# Patient Record
Sex: MALE | Race: WHITE | NOT HISPANIC OR LATINO | Employment: OTHER | URBAN - METROPOLITAN AREA
[De-identification: names, ages, dates, MRNs, and addresses within clinical notes are randomized per-mention and may not be internally consistent; named-entity substitution may affect disease eponyms.]

---

## 2019-05-22 ENCOUNTER — APPOINTMENT (OUTPATIENT)
Dept: WOUND CARE | Facility: HOSPITAL | Age: 59
End: 2019-05-22
Payer: MEDICARE

## 2019-05-22 PROCEDURE — 11045 DBRDMT SUBQ TISS EACH ADDL: CPT | Performed by: FAMILY MEDICINE

## 2019-05-22 PROCEDURE — 99205 OFFICE O/P NEW HI 60 MIN: CPT | Performed by: FAMILY MEDICINE

## 2019-05-22 PROCEDURE — 11042 DBRDMT SUBQ TIS 1ST 20SQCM/<: CPT | Performed by: FAMILY MEDICINE

## 2019-06-20 ENCOUNTER — APPOINTMENT (OUTPATIENT)
Dept: WOUND CARE | Facility: HOSPITAL | Age: 59
End: 2019-06-20
Payer: MEDICARE

## 2019-06-20 PROCEDURE — 11042 DBRDMT SUBQ TIS 1ST 20SQCM/<: CPT | Performed by: FAMILY MEDICINE

## 2019-06-20 PROCEDURE — 97597 DBRDMT OPN WND 1ST 20 CM/<: CPT | Performed by: FAMILY MEDICINE

## 2021-03-30 DIAGNOSIS — Z23 ENCOUNTER FOR IMMUNIZATION: ICD-10-CM

## 2023-08-25 ENCOUNTER — TELEPHONE (OUTPATIENT)
Dept: PALLIATIVE MEDICINE | Facility: CLINIC | Age: 63
End: 2023-08-25

## 2023-09-06 ENCOUNTER — OFFICE VISIT (OUTPATIENT)
Dept: PALLIATIVE MEDICINE | Facility: CLINIC | Age: 63
End: 2023-09-06
Payer: MEDICARE

## 2023-09-06 ENCOUNTER — CLINICAL SUPPORT (OUTPATIENT)
Dept: PALLIATIVE MEDICINE | Facility: CLINIC | Age: 63
End: 2023-09-06

## 2023-09-06 VITALS — HEART RATE: 67 BPM | TEMPERATURE: 97.7 F | RESPIRATION RATE: 16 BRPM | OXYGEN SATURATION: 98 %

## 2023-09-06 DIAGNOSIS — Z71.89 GOALS OF CARE, COUNSELING/DISCUSSION: ICD-10-CM

## 2023-09-06 DIAGNOSIS — Z51.5 PALLIATIVE CARE BY SPECIALIST: ICD-10-CM

## 2023-09-06 DIAGNOSIS — G82.50 QUADRIPLEGIA (HCC): ICD-10-CM

## 2023-09-06 DIAGNOSIS — I51.9 HEART DISEASE: Primary | ICD-10-CM

## 2023-09-06 DIAGNOSIS — Z71.89 COUNSELING AND COORDINATION OF CARE: Primary | ICD-10-CM

## 2023-09-06 DIAGNOSIS — G47.09 OTHER INSOMNIA: ICD-10-CM

## 2023-09-06 DIAGNOSIS — I35.0 SEVERE AORTIC STENOSIS: ICD-10-CM

## 2023-09-06 DIAGNOSIS — R06.00 DYSPNEA, UNSPECIFIED TYPE: ICD-10-CM

## 2023-09-06 DIAGNOSIS — R06.02 SHORTNESS OF BREATH: ICD-10-CM

## 2023-09-06 PROCEDURE — 99205 OFFICE O/P NEW HI 60 MIN: CPT | Performed by: INTERNAL MEDICINE

## 2023-09-06 PROCEDURE — 99024 POSTOP FOLLOW-UP VISIT: CPT

## 2023-09-06 RX ORDER — DIPHENOXYLATE HYDROCHLORIDE AND ATROPINE SULFATE 2.5; .025 MG/1; MG/1
1 TABLET ORAL DAILY
COMMUNITY

## 2023-09-06 RX ORDER — LOSARTAN POTASSIUM 25 MG/1
25 TABLET ORAL DAILY PRN
COMMUNITY

## 2023-09-06 RX ORDER — ROSUVASTATIN CALCIUM 20 MG/1
20 TABLET, COATED ORAL DAILY
COMMUNITY
Start: 2023-08-02 | End: 2024-08-01

## 2023-09-06 RX ORDER — NALOXONE HYDROCHLORIDE 4 MG/.1ML
SPRAY NASAL
Qty: 1 EACH | Refills: 1 | Status: SHIPPED | OUTPATIENT
Start: 2023-09-06 | End: 2024-09-05

## 2023-09-06 RX ORDER — CLOTRIMAZOLE 10 MG/1
10 LOZENGE ORAL; TOPICAL
COMMUNITY

## 2023-09-06 RX ORDER — VENLAFAXINE 100 MG/1
100 TABLET ORAL 3 TIMES DAILY
COMMUNITY

## 2023-09-06 RX ORDER — MORPHINE SULFATE 15 MG/1
15 TABLET ORAL EVERY 4 HOURS PRN
COMMUNITY
End: 2023-09-06

## 2023-09-06 RX ORDER — MULTIVITAMIN WITH IRON
1 TABLET ORAL EVERY EVENING
COMMUNITY

## 2023-09-06 RX ORDER — HYDROXYZINE HYDROCHLORIDE 25 MG/1
25 TABLET, FILM COATED ORAL DAILY PRN
COMMUNITY

## 2023-09-06 RX ORDER — METFORMIN HYDROCHLORIDE 500 MG/5ML
500 SOLUTION ORAL DAILY
COMMUNITY
Start: 2023-08-01

## 2023-09-06 RX ORDER — GABAPENTIN 400 MG/1
1200 CAPSULE ORAL 3 TIMES DAILY
COMMUNITY
Start: 2023-08-01

## 2023-09-06 RX ORDER — MORPHINE SULFATE 100 MG/5ML
5 SOLUTION, CONCENTRATE ORAL EVERY 2 HOUR PRN
Qty: 30 ML | Refills: 0 | Status: SHIPPED | OUTPATIENT
Start: 2023-09-06

## 2023-09-06 RX ORDER — MIDODRINE HYDROCHLORIDE 5 MG/1
2.5 TABLET ORAL
COMMUNITY

## 2023-09-06 NOTE — PROGRESS NOTES
Palliative Supportive Care  met with patient/family to continue to provide emotional support and guidance. Updated biopsychosocial information relevant to support:  LCSW had the pleasure of meeting patient and wife for their first visit with Palliative Medicine - daughter-in-law attended via phone. Dr. Indira Dickinson was able to discuss patient's medical questions/concerns. LCSW provided an overview of SW support within Palliative. Wife is appropriately tearful regarding the potential of surgical intervention not being offered or it being very risky. LCSW was able to provide a supportive environment to start the conversation. Provided the explanation that these are difficult conversations but that being open and honest with each other they can begin to process and address the inevitable that everyone's time is limited. Also, stating that discussions do not change the medical situation but provides a supportive experience. Wife does report that recently patient has been growning in his sleep. This may be related to his recent medical changes and the tough decisions that may come soon. Patient feels comfortable with his  and may want to start these conversations with him. Patient and wife are offered counseling support by LCSW if/when they feel it useful. Identified areas of need include: continuity and support  Resources provided: office contacts  Areas that need future follow-up include:  Emotional support and guidance as requested.       I have spent 60 minutes with Patient  today in which greater than 50% of this time was spent in counseling/coordination of care     Palliative  will follow-up as requested by patient, family, and primary team.  Please contact with any specific requests

## 2023-09-06 NOTE — PROGRESS NOTES
Palliative and Supportive Care   Uriah Rodriguez 66 y.o. male [id number]    Assessment/Plan:  1. Heart disease    2. Dyspnea, unspecified type    3. Shortness of breath    4. Severe aortic stenosis    5. Quadriplegia ([address])    6. Palliative care by specialist    7. Goals of care, counseling/discussion    8. Other insomnia      Will trial roxanol 5mg PRN for dyspnea with close monitoring for side effects or hemodynamic instability (has tolerated MSIR 15mg in past but still will be cautious). For insomnia, discussed journaling and counseling. [facility] SW provided resources and guidance. Discussed goals of care, patient has POA/ACP docs. Encouraged to update ACP docs and provided with advanced directive forms. Current care plan is to pursue workup for possible TAVR and then reassess depending on options. Formal follow up in 4-6 weeks, but instructed to call my office with any feedback on morphine therapy or should clinical condition change. Requested Prescriptions     Signed Prescriptions Disp Refills   • Morphine Sulfate, Concentrate, 20 mg/mL concentrated solution 30 mL 0     Sig: Take 0.25 mL (5 mg total) by mouth every 2 (two) hours as needed (breathlessness) Max Daily Amount: 60 mg   • naloxone (NARCAN) 4 mg/0.1 mL nasal spray 1 each 1     Sig: Administer 1 spray into a nostril. If no response after 2-3 minutes, give another dose in the other nostril using a new spray. Medications Discontinued During This Encounter   Medication Reason   • morphine (MSIR) 15 mg tablet        Representatives have queried the patient's controlled substance dispensing history in the Prescription Drug Monitoring Program in compliance with regulations before I have prescribed any controlled substances. The prescription history is consistent with prescribed therapy and our practice policies.       75+ minutes (10:15-11:30) were spent face to face with Uriah Rodriguez, his spouse and his daughter in law SUNDSVALL with greater than 50% of the time spent in counseling or coordination of care including discussions of treatment instructions . All of the patient's questions were answered during this discussion. No follow-ups on file. Subjective:   Chief Complaint  New consultation for:  symptom management, goal of care assessment and decisional support, disease process education and discussion of prognosis, advance care planning, emotional support in the setting of serious illness  HPI     Karlie Lafleur is a 66 y.o. male with 20+ year hx of quadraplegia as well as previous hx of stroke/seizure, severe AS, and recent nephrostomy tube placement for nephrolithiasis for which he was recently hospitalized. Since that hospitalization he has had significant dyspnea when not lying flat that has markedly worsened his QoL. Instead of being able to sit and eat with family, or transport short distances, he now is mostly bed-bound with limited time in his chair. Discussed at great length how this has affected his quality of life with patient, his spouse, and his daughter-in-law. Denies new weakness, postural changes, loss of muscle tone, edema. No changes in appetite. Very occasional nausea without vomiting. Is struggling with insomnia and feels his mind is racing. No bowel changes. Does endorse pain in his shoulder which is chronic for years. Takes one MSIR approx. 3 times per year but mostly relies on tylenol. The following portions of the medical history were reviewed: past medical history, problem list, medication list, and social history.     Current Outpatient Medications:   •  clotrimazole (MYCELEX) 10 mg justin, Take 10 mg by mouth, Disp: , Rfl:   •  gabapentin (NEURONTIN) 400 mg capsule, Take 1,200 mg by mouth 3 (three) times a day, Disp: , Rfl:   •  hydrOXYzine HCL (ATARAX) 25 mg tablet, Take 25 mg by mouth daily as needed, Disp: , Rfl:   •  losartan (COZAAR) 25 mg tablet, Take 25 mg by mouth daily as needed, Disp: , Rfl:   •  Magnesium 250 MG TABS, Take 1 tablet by mouth every evening, Disp: , Rfl:   •  Metformin HCl (RIOMET) 500 MG/5ML oral solution, Take 500 mg by mouth in the morning, Disp: , Rfl:   •  midodrine (PROAMATINE) 5 mg tablet, Take 2.5 mg by mouth, Disp: , Rfl:   •  Morphine Sulfate, Concentrate, 20 mg/mL concentrated solution, Take 0.25 mL (5 mg total) by mouth every 2 (two) hours as needed (breathlessness) Max Daily Amount: 60 mg, Disp: 30 mL, Rfl: 0  •  multivitamin (THERAGRAN) TABS, Take 1 tablet by mouth daily, Disp: , Rfl:   •  naloxone (NARCAN) 4 mg/0.1 mL nasal spray, Administer 1 spray into a nostril. If no response after 2-3 minutes, give another dose in the other nostril using a new spray., Disp: 1 each, Rfl: 1  •  rosuvastatin (CRESTOR) 20 MG tablet, Take 20 mg by mouth daily, Disp: , Rfl:   •  venlafaxine (EFFEXOR) 100 MG tablet, Take 100 mg by mouth Three times a day, Disp: , Rfl:   Review of Systems   Constitutional: Positive for activity change. Negative for unexpected weight change. HENT: Negative for congestion. Respiratory: Positive for shortness of breath. Negative for chest tightness. Cardiovascular: Negative for chest pain and leg swelling. Gastrointestinal: Positive for nausea. Negative for abdominal pain, constipation, diarrhea and vomiting. Musculoskeletal: Positive for arthralgias. Neurological: Negative for syncope and light-headedness. Psychiatric/Behavioral: Positive for sleep disturbance. The patient is nervous/anxious. All other systems reviewed and are negative. All other systems negative    Objective:  Vital Signs  Pulse 67   Temp 97.7 °F (36.5 °C) (Temporal)   Resp 16   SpO2 98%    Physical Exam    Constitutional: Appears well-developed and well-nourished. In no acute distress. Head: Normocephalic and atraumatic. Eyes: EOM are normal. Right eye exhibits no discharge. Left eye exhibits no discharge. No scleral icterus. Pulmonary/Chest: Effort normal. No stridor. No respiratory distress. Abdominal: No distension. Musculoskeletal: No edema. B/l UE/LE contractures. In a power wheelchair. Neurological: Alert, oriented and appropriately conversant. Skin: Skin is dry, not diaphoretic. Psychiatric: Displays a normal mood and affect. Behavior, judgement and thought content appear normal.   Vitals reviewed.

## 2023-09-07 ENCOUNTER — TELEPHONE (OUTPATIENT)
Dept: PALLIATIVE MEDICINE | Facility: CLINIC | Age: 63
End: 2023-09-07

## 2023-09-07 NOTE — TELEPHONE ENCOUNTER
Spouse called in stated the morphine worked wanted to know if he can increase it because it is not lasting long enough. Only lasting about a hour, takes shortness breath away.

## 2023-09-26 ENCOUNTER — TELEPHONE (OUTPATIENT)
Dept: PALLIATIVE MEDICINE | Facility: CLINIC | Age: 63
End: 2023-09-26

## 2023-09-26 NOTE — TELEPHONE ENCOUNTER
Jose Reddy is calling wanted you to be aware that they were told Tl jackson have his kidney surgery and if you want to give her a call she can give you more details.

## 2023-10-25 ENCOUNTER — OFFICE VISIT (OUTPATIENT)
Dept: PALLIATIVE MEDICINE | Facility: CLINIC | Age: 63
End: 2023-10-25

## 2023-10-25 ENCOUNTER — TELEPHONE (OUTPATIENT)
Age: 63
End: 2023-10-25

## 2023-10-25 VITALS
HEART RATE: 79 BPM | SYSTOLIC BLOOD PRESSURE: 112 MMHG | DIASTOLIC BLOOD PRESSURE: 68 MMHG | TEMPERATURE: 98.2 F | RESPIRATION RATE: 18 BRPM | OXYGEN SATURATION: 97 %

## 2023-10-25 DIAGNOSIS — Z71.89 GOALS OF CARE, COUNSELING/DISCUSSION: Primary | ICD-10-CM

## 2023-10-25 DIAGNOSIS — R06.02 SHORTNESS OF BREATH: ICD-10-CM

## 2023-10-25 DIAGNOSIS — I35.0 SEVERE AORTIC STENOSIS: ICD-10-CM

## 2023-10-25 DIAGNOSIS — G82.50 QUADRIPLEGIA (HCC): ICD-10-CM

## 2023-10-25 DIAGNOSIS — Z51.5 PALLIATIVE CARE BY SPECIALIST: ICD-10-CM

## 2023-10-25 DIAGNOSIS — G47.09 OTHER INSOMNIA: ICD-10-CM

## 2023-10-25 DIAGNOSIS — N13.2 HYDRONEPHROSIS WITH OBSTRUCTING CALCULUS: ICD-10-CM

## 2023-10-25 NOTE — TELEPHONE ENCOUNTER
Patient's Palliative Care provider Dr. Kamala Byrd MD office called today wanting to have the patient scheduled ASAP. Caller was transferred to triage.

## 2023-10-25 NOTE — PROGRESS NOTES
Palliative and Supportive Care   Jono Ravi 66 y.o. male [id number]    Assessment/Plan:  1. Goals of care, counseling/discussion    2. Palliative care by specialist    3. Severe aortic stenosis    4. Shortness of breath    5. Other insomnia    6. Quadriplegia ([address])    7. Hydronephrosis with obstructing calculus      Continue with current roxanol dosing. No med changes today. Patient interested in receiving 2nd opinion in [facility]. Will make appropriate referrals. Follow up in 2 months. Requested Prescriptions      No prescriptions requested or ordered in this encounter     There are no discontinued medications. Representatives have queried the patient's controlled substance dispensing history in the Prescription Drug Monitoring Program in compliance with regulations before I have prescribed any controlled substances. The prescription history is consistent with prescribed therapy and our practice policies. 20 minutes were spent face to face with Jono Ravi and his spouse with greater than 50% of the time spent in counseling or coordination of care including discussions of risks and benefits of treatment, treatment instructions, and patient and family counseling/involvement in care . All of the patient's questions were answered during this discussion. No follow-ups on file. Subjective:   Chief Complaint  Follow up visit for:  symptom management, assessment of goals of care  HPI     Jono Ravi is a 66 y.o. male with severe AS as well as hydronephrosis s/p nephrostomy tube insertion who presents for follow up. Symptomatically he is stable to improved. Endorses improved exertional tolerance from using his ISB. Morphine continues to be effective, giving him approx 1 hour of symptom improvement. Otherwise he remains comfortable when he is recumbent. Chronic pain is present, but managed. Denies constipation.     Overall he and his spouse are frustrated with his lack of a definitive care plan. They feel that if they could get answers about what interventions are being offered (or not) they would be able to plan accordingly. Nevertheless, they focus on taking things one day at a time. The following portions of the medical history were reviewed: past medical history, problem list, medication list, and social history. Current Outpatient Medications:     clotrimazole (MYCELEX) 10 mg justin, Take 10 mg by mouth, Disp: , Rfl:     gabapentin (NEURONTIN) 400 mg capsule, Take 1,200 mg by mouth 3 (three) times a day, Disp: , Rfl:     hydrOXYzine HCL (ATARAX) 25 mg tablet, Take 25 mg by mouth daily as needed, Disp: , Rfl:     losartan (COZAAR) 25 mg tablet, Take 25 mg by mouth daily as needed, Disp: , Rfl:     Magnesium 250 MG TABS, Take 1 tablet by mouth every evening, Disp: , Rfl:     Metformin HCl (RIOMET) 500 MG/5ML oral solution, Take 500 mg by mouth in the morning, Disp: , Rfl:     midodrine (PROAMATINE) 5 mg tablet, Take 2.5 mg by mouth, Disp: , Rfl:     Morphine Sulfate, Concentrate, 20 mg/mL concentrated solution, Take 0.25 mL (5 mg total) by mouth every 2 (two) hours as needed (breathlessness) Max Daily Amount: 60 mg, Disp: 30 mL, Rfl: 0    multivitamin (THERAGRAN) TABS, Take 1 tablet by mouth daily, Disp: , Rfl:     naloxone (NARCAN) 4 mg/0.1 mL nasal spray, Administer 1 spray into a nostril.  If no response after 2-3 minutes, give another dose in the other nostril using a new spray., Disp: 1 each, Rfl: 1    rosuvastatin (CRESTOR) 20 MG tablet, Take 20 mg by mouth daily, Disp: , Rfl:     venlafaxine (EFFEXOR) 100 MG tablet, Take 100 mg by mouth Three times a day, Disp: , Rfl:   Review of Systems    All other systems negative    Objective:  Vital Signs  /68 (BP Location: Right arm, Patient Position: Sitting, Cuff Size: Standard)   Pulse 79   Temp 98.2 °F (36.8 °C) (Temporal)   Resp 18   SpO2 97%    Physical Exam    Constitutional: Appears well-developed and well-nourished. In no acute distress. Head: Normocephalic and atraumatic. Eyes: EOM are normal. Right eye exhibits no discharge. Left eye exhibits no discharge. No scleral icterus. Pulmonary/Chest: Effort normal. No stridor. No respiratory distress. Abdominal: No distension. Musculoskeletal: No edema. Stable contractures. Neurological: Alert, oriented and appropriately conversant. Skin: Skin is dry, not diaphoretic. Psychiatric: Displays a normal mood and affect. Behavior, judgement and thought content appear normal.   Vitals reviewed.

## 2023-10-25 NOTE — TELEPHONE ENCOUNTER
Spoke to [place] (wife) and we scheduled him for the [place] location for 11/1/2023. Made two attempts to speak to Abhishek Pelayo, called [phone]. Told her to call back and ask for [facility].

## 2023-10-26 NOTE — TELEPHONE ENCOUNTER
Per nurse, appointment time changed slightly.   Message left for patient that he should arrive 11/1/23 at 9:15 for a 9:30 appointment with Dr. Donnie Dykes at [facility].

## 2023-10-30 ENCOUNTER — TELEPHONE (OUTPATIENT)
Age: 63
End: 2023-10-30

## 2023-10-30 NOTE — TELEPHONE ENCOUNTER
Patient's wife Karthik Liang called regarding patient struggling with blood sugar- was unsure if he was low or high. Did not test.  In between switching from [facility] to [facility], looking for guidance.

## 2023-10-30 NOTE — PROGRESS NOTES
10/30/2023 12:18 PM -      Call placed to patient's spouse, Rose Ortiz, to follow up on reports of a challenging weekend. Unable to contact, and did not leave VM. Will remain available to provide guidance to family. Appreciate pt has urology and cardiac surgery appts in the next 2 weeks. Mulu Szymanski MD  Palliative and Supportive Care  Clinic/Answering Service: [phone]  You can find me on [employer]!

## 2023-11-01 ENCOUNTER — OFFICE VISIT (OUTPATIENT)
Dept: UROLOGY | Facility: CLINIC | Age: 63
End: 2023-11-01
Payer: COMMERCIAL

## 2023-11-01 VITALS — DIASTOLIC BLOOD PRESSURE: 54 MMHG | HEART RATE: 54 BPM | SYSTOLIC BLOOD PRESSURE: 82 MMHG

## 2023-11-01 DIAGNOSIS — N20.0 CALCULUS OF KIDNEY: ICD-10-CM

## 2023-11-01 DIAGNOSIS — R06.02 SHORTNESS OF BREATH: ICD-10-CM

## 2023-11-01 DIAGNOSIS — Z51.5 PALLIATIVE CARE BY SPECIALIST: Primary | ICD-10-CM

## 2023-11-01 DIAGNOSIS — G82.50 QUADRIPLEGIA (HCC): ICD-10-CM

## 2023-11-01 DIAGNOSIS — I35.0 SEVERE AORTIC STENOSIS: ICD-10-CM

## 2023-11-01 PROCEDURE — 99204 OFFICE O/P NEW MOD 45 MIN: CPT | Performed by: UROLOGY

## 2023-11-01 RX ORDER — FAMOTIDINE 40 MG/1
TABLET, FILM COATED ORAL
COMMUNITY
Start: 2023-08-28

## 2023-11-01 NOTE — LETTER
November 1, 2023     Dilcia Lorenzo DO  [address] [place] [address]    Patient: Hermilo Fuentes   YOB: 1960   Date of Visit: 11/1/2023       Dear Dr. Blanco Bean: Thank you for referring Hermilo Fuentes to me for evaluation. Below are my notes for this consultation. If you have questions, please do not hesitate to call me. I look forward to following your patient along with you. Sincerely,        Karoline Sanchez MD        CC: Farrell Sacks, MD Clora Picking, MD Rosary Jenkins, MD  11/1/2023 10:10 AM  Sign when Signing Visit  UROLOGY SECOND OPINION NOTE     CHIEF COMPLAINT   Hermilo Fuentes is a 66 y.o. male with a complaint of   Chief Complaint   Patient presents with   • New Patient Visit       History of Present Illness:   Hermilo Fuentes is a 66 y.o. male here for second opinion. Patient has been treated by [facility] urology. 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 Patient has had long-term management of his bladder with suprapubic tube. Patient has significant stone burden in the right kidney with large calyceal stones and a large obstructing proximal ureteral stone. Nephrostomy tube was placed for decompression in July. Patient has significant medical comorbidities from a cardiac and pulmonary standpoint. He has been followed by CT surgery and pulmonology at [facility]. Patient potentially needs valve surgery but there is concerns about ongoing pulmonary issues. The urology team there has had challenges obtaining medical clearances to be able to offer the patient stone surgery given the comorbidities. Patient has seen palliative care at our institution who requested a second opinion with urology. Additionally, second opinion CT surgery appointment upcoming in November. Patient was seen by the [facility] urology group on October 30 and offered PCNL.     Urinary Score(s)     AUA SYMPTOM SCORE Flowsheet Row Most Recent Value   AUA SYMPTOM SCORE    How often have you had a sensation of not emptying your bladder completely after you finished urinating? 0 (P)     How often have you had to urinate again less than two hours after you finished urinating? 0 (P)     How often have you found you stopped and started again several times when you urinate? 0 (P)     How often have you found it difficult to postpone urination? 0 (P)     How often have you had a weak urinary stream? 0 (P)     How often have you had to push or strain to begin urination? 0 (P)     How many times did you most typically get up to urinate from the time you went to bed at night until the time you got up in the morning? 0 (P)     Quality of Life: If you were to spend the rest of your life with your urinary condition just the way it is now, how would you feel about that? 5 (P)     AUA SYMPTOM SCORE 0 (P)                Past Medical History:     Past Medical History:   Diagnosis Date   • Anemia    • CHF (congestive heart failure) ([address])    • Diabetes mellitus ([address])    • Hypertension    • Seizures ([address])    • Stroke ([address])        PAST SURGICAL HISTORY:     Past Surgical History:   Procedure Laterality Date   • SPINE SURGERY         CURRENT MEDICATIONS:     Current Outpatient Medications   Medication Sig Dispense Refill   • clotrimazole (MYCELEX) 10 mg justin Take 10 mg by mouth     • famotidine (PEPCID) 40 MG tablet      • gabapentin (NEURONTIN) 400 mg capsule Take 1,200 mg by mouth 3 (three) times a day     • hydrOXYzine HCL (ATARAX) 25 mg tablet Take 25 mg by mouth daily as needed     • losartan (COZAAR) 25 mg tablet Take 25 mg by mouth daily as needed     • Magnesium 250 MG TABS Take 1 tablet by mouth every evening     • Metformin HCl (RIOMET) 500 MG/5ML oral solution Take 500 mg by mouth in the morning     • midodrine (PROAMATINE) 5 mg tablet Take 2.5 mg by mouth     • Morphine Sulfate, Concentrate, 20 mg/mL concentrated solution Take 0.25 mL (5 mg total) by mouth every 2 (two) hours as needed (breathlessness) Max Daily Amount: 60 mg 30 mL 0   • multivitamin (THERAGRAN) TABS Take 1 tablet by mouth daily     • naloxone (NARCAN) 4 mg/0.1 mL nasal spray Administer 1 spray into a nostril. If no response after 2-3 minutes, give another dose in the other nostril using a new spray. 1 each 1   • rosuvastatin (CRESTOR) 20 MG tablet Take 20 mg by mouth daily     • venlafaxine (EFFEXOR) 100 MG tablet Take 100 mg by mouth Three times a day       No current facility-administered medications for this visit. ALLERGIES:     Allergies   Allergen Reactions   • Dopamine Shortness Of Breath   • Cefepime Hives     clotilde. piperacillin   • Ciprofloxacin GI Intolerance   • Daptomycin Other (See Comments)     elevated cpk,clotilde vanco       SOCIAL HISTORY:     Social History     Socioeconomic History   • Marital status: /Civil Union     Spouse name: None   • Number of children: None   • Years of education: None   • Highest education level: None   Occupational History   • None   Tobacco Use   • Smoking status: Never   • Smokeless tobacco: Never   Vaping Use   • Vaping Use: Never used   Substance and Sexual Activity   • Alcohol use: Not Currently   • Drug use: Never   • Sexual activity: Not Currently     Partners: Female     Birth control/protection: Abstinence   Other Topics Concern   • None   Social History Narrative   • None     Social Determinants of Health     Financial Resource Strain: Not on file   Food Insecurity: Not on file   Transportation Needs: Not on file   Physical Activity: Not on file   Stress: Not on file   Social Connections: Not on file   Intimate Partner Violence: Not on file   Housing Stability: Not on file       SOCIAL HISTORY:     Family History   Problem Relation Age of Onset   • COPD Mother    • Heart disease Mother        REVIEW OF SYSTEMS:     Review of Systems   Constitutional:  Positive for activity change. Negative for chills and fever. HENT:  Negative for ear pain and sore throat. Eyes:  Negative for pain and visual disturbance. Respiratory:  Positive for shortness of breath. Negative for cough. Cardiovascular:  Negative for chest pain and palpitations. Gastrointestinal:  Negative for abdominal pain and vomiting. Genitourinary:  Negative for dysuria, flank pain and hematuria. Musculoskeletal:  Positive for gait problem. Negative for arthralgias and back pain. Skin:  Negative for color change and rash. Neurological:  Negative for seizures, syncope and speech difficulty. All other systems reviewed and are negative. PHYSICAL EXAM:     BP (!) 82/54 (BP Location: Right arm, Patient Position: Sitting, Cuff Size: Adult)   Pulse (!) 54     Physical Exam  Vitals reviewed. Constitutional:       General: He is not in acute distress. Appearance: He is well-developed. Comments: Quadriplegic male, wheelchair-bound   HENT:      Head: Normocephalic and atraumatic. Eyes:      Pupils: Pupils are equal, round, and reactive to light. Cardiovascular:      Rate and Rhythm: Normal rate. Pulmonary:      Effort: Pulmonary effort is normal. No respiratory distress. Breath sounds: Normal breath sounds. Abdominal:      General: There is no distension. Palpations: Abdomen is soft. Tenderness: There is no abdominal tenderness. Comments: Right-sided nephrostomy tube   Genitourinary:     Comments: Suprapubic tube  Musculoskeletal:         General: Normal range of motion. Cervical back: Normal range of motion and neck supple. Skin:     General: Skin is warm and dry. Neurological:      Mental Status: He is alert and oriented to person, place, and time.       Comments: Quadriplegia   Psychiatric:         Behavior: Behavior normal.         LABS:     CBC: No results found for: "WBC", "HGB", "HCT", "MCV", "PLT"    BMP: No results found for: "GLUCOSE", "CALCIUM", "NA", "K", "CO2", "CL", "BUN", "CREATININE"      IMAGING:     OUTSIDE CT 7/2023    ASSESSMENT:     66 y.o. male  with significant right-sided partial staghorn stone burden, neurogenic bladder managed with suprapubic tube    PLAN:     I had a very nice conversation with the patient and his wife. We discussed management of the patient's stone. We discussed percutaneous nephrolithotomy surgery as the gold standard for large stone burden. This allows the patient to have the least amount of surgical intervention with the highest rate of stone free clearance. This is certainly the surgery that would be preferred given the existing nephrostomy tube and the degree of stone seen on CT. However, this surgery is complicated as it is more aggressive with a higher risk profile. This higher risk is compounded by the patient's quadriplegia, history of autonomic dysreflexia, contractures and positioning, current valve and pulmonary disorders. It appears that the patient has been offered PCNL surgery which is being scheduled at [facility] at the current time. If the team is able to overcome cardiac and pulmonary clearances, I recommended that he proceed with the surgery as offered. He understands that at our institution PCNL surgery would be offered under a general anesthetic. However, I think given the degree of complexity here, I would recommend against PCNL surgery here at [facility]. If the [facility] team is not comfortable where the scheduling cannot proceed, I have discussed the offer of a referral to a fellowship trained Endo urology stone expert in [place]. Patient does have significant valve disease and at current the [facility] cardiothoracic team is deferring TAVR given the infection risk from the suprapubic, nephrostomy tube and stone burden. Certainly superinfection of the valve implant would be disastrous.   However, even if PCNL surgery is successful and the nephrostomy and stones are removed, the patient will still have infection risk from the long-term indwelling suprapubic tube. Patient has an upcoming second opinion with [facility] cardiothoracic team.  Certainly their opinion will be valuable as there is increased cardiovascular risk for a PCNL surgery given the ongoing valve concerns. While I remain available to assist the patient with a third opinion referral, the patient and his wife are currently comfortable continuing the plan for management at [facility] given the recent opinion. I will be including the cardiothoracic team and palliative care team on today's notation.

## 2023-11-01 NOTE — PROGRESS NOTES
UROLOGY SECOND OPINION NOTE     CHIEF COMPLAINT   Hans Montoya is a 66 y.o. male with a complaint of   Chief Complaint   Patient presents with    New Patient Visit       History of Present Illness:   Hans Montoya is a 66 y.o. male here for second opinion. Patient has been treated by [facility] urology. 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 Patient has had long-term management of his bladder with suprapubic tube. Patient has significant stone burden in the right kidney with large calyceal stones and a large obstructing proximal ureteral stone. Nephrostomy tube was placed for decompression in July. Patient has significant medical comorbidities from a cardiac and pulmonary standpoint. He has been followed by CT surgery and pulmonology at [facility]. Patient potentially needs valve surgery but there is concerns about ongoing pulmonary issues. The urology team there has had challenges obtaining medical clearances to be able to offer the patient stone surgery given the comorbidities. Patient has seen palliative care at our institution who requested a second opinion with urology. Additionally, second opinion CT surgery appointment upcoming in November. Patient was seen by the [facility] urology group on October 30 and offered PCNL.     Urinary Score(s)     AUA SYMPTOM SCORE      Flowsheet Row Most Recent Value   AUA SYMPTOM SCORE    How often have you had a sensation of not emptying your bladder completely after you finished urinating? 0 (P)     How often have you had to urinate again less than two hours after you finished urinating? 0 (P)     How often have you found you stopped and started again several times when you urinate? 0 (P)     How often have you found it difficult to postpone urination? 0 (P)     How often have you had a weak urinary stream? 0 (P)     How often have you had to push or strain to begin urination? 0 (P)     How many times did you most typically get up to urinate from the time you went to bed at night until the time you got up in the morning? 0 (P)     Quality of Life: If you were to spend the rest of your life with your urinary condition just the way it is now, how would you feel about that? 5 (P)     AUA SYMPTOM SCORE 0 (P)                Past Medical History:     Past Medical History:   Diagnosis Date    Anemia     CHF (congestive heart failure) (HCC)     Diabetes mellitus ([address])     Hypertension     Seizures ([address])     Stroke ([address])        PAST SURGICAL HISTORY:     Past Surgical History:   Procedure Laterality Date    SPINE SURGERY         CURRENT MEDICATIONS:     Current Outpatient Medications   Medication Sig Dispense Refill    clotrimazole (MYCELEX) 10 mg justin Take 10 mg by mouth      famotidine (PEPCID) 40 MG tablet       gabapentin (NEURONTIN) 400 mg capsule Take 1,200 mg by mouth 3 (three) times a day      hydrOXYzine HCL (ATARAX) 25 mg tablet Take 25 mg by mouth daily as needed      losartan (COZAAR) 25 mg tablet Take 25 mg by mouth daily as needed      Magnesium 250 MG TABS Take 1 tablet by mouth every evening      Metformin HCl (RIOMET) 500 MG/5ML oral solution Take 500 mg by mouth in the morning      midodrine (PROAMATINE) 5 mg tablet Take 2.5 mg by mouth      Morphine Sulfate, Concentrate, 20 mg/mL concentrated solution Take 0.25 mL (5 mg total) by mouth every 2 (two) hours as needed (breathlessness) Max Daily Amount: 60 mg 30 mL 0    multivitamin (THERAGRAN) TABS Take 1 tablet by mouth daily      naloxone (NARCAN) 4 mg/0.1 mL nasal spray Administer 1 spray into a nostril. If no response after 2-3 minutes, give another dose in the other nostril using a new spray. 1 each 1    rosuvastatin (CRESTOR) 20 MG tablet Take 20 mg by mouth daily      venlafaxine (EFFEXOR) 100 MG tablet Take 100 mg by mouth Three times a day       No current facility-administered medications for this visit.        ALLERGIES:     Allergies   Allergen Reactions    Dopamine Shortness Of Breath    Cefepime Hives     clotilde. piperacillin    Ciprofloxacin GI Intolerance    Daptomycin Other (See Comments)     elevated cpk,clotilde vanco       SOCIAL HISTORY:     Social History     Socioeconomic History    Marital status: /Civil Union     Spouse name: None    Number of children: None    Years of education: None    Highest education level: None   Occupational History    None   Tobacco Use    Smoking status: Never    Smokeless tobacco: Never   Vaping Use    Vaping Use: Never used   Substance and Sexual Activity    Alcohol use: Not Currently    Drug use: Never    Sexual activity: Not Currently     Partners: Female     Birth control/protection: Abstinence   Other Topics Concern    None   Social History Narrative    None     Social Determinants of Health     Financial Resource Strain: Not on file   Food Insecurity: Not on file   Transportation Needs: Not on file   Physical Activity: Not on file   Stress: Not on file   Social Connections: Not on file   Intimate Partner Violence: Not on file   Housing Stability: Not on file       SOCIAL HISTORY:     Family History   Problem Relation Age of Onset    COPD Mother     Heart disease Mother        REVIEW OF SYSTEMS:     Review of Systems   Constitutional:  Positive for activity change. Negative for chills and fever. HENT:  Negative for ear pain and sore throat. Eyes:  Negative for pain and visual disturbance. Respiratory:  Positive for shortness of breath. Negative for cough. Cardiovascular:  Negative for chest pain and palpitations. Gastrointestinal:  Negative for abdominal pain and vomiting. Genitourinary:  Negative for dysuria, flank pain and hematuria. Musculoskeletal:  Positive for gait problem. Negative for arthralgias and back pain. Skin:  Negative for color change and rash. Neurological:  Negative for seizures, syncope and speech difficulty. All other systems reviewed and are negative.         PHYSICAL EXAM:     BP (!) 82/54 (BP Location: Right arm, Patient Position: Sitting, Cuff Size: Adult)   Pulse (!) 54     Physical Exam  Vitals reviewed. Constitutional:       General: He is not in acute distress. Appearance: He is well-developed. Comments: Quadriplegic male, wheelchair-bound   HENT:      Head: Normocephalic and atraumatic. Eyes:      Pupils: Pupils are equal, round, and reactive to light. Cardiovascular:      Rate and Rhythm: Normal rate. Pulmonary:      Effort: Pulmonary effort is normal. No respiratory distress. Breath sounds: Normal breath sounds. Abdominal:      General: There is no distension. Palpations: Abdomen is soft. Tenderness: There is no abdominal tenderness. Comments: Right-sided nephrostomy tube   Genitourinary:     Comments: Suprapubic tube  Musculoskeletal:         General: Normal range of motion. Cervical back: Normal range of motion and neck supple. Skin:     General: Skin is warm and dry. Neurological:      Mental Status: He is alert and oriented to person, place, and time. Comments: Quadriplegia   Psychiatric:         Behavior: Behavior normal.         LABS:     CBC: No results found for: "WBC", "HGB", "HCT", "MCV", "PLT"    BMP: No results found for: "GLUCOSE", "CALCIUM", "NA", "K", "CO2", "CL", "BUN", "CREATININE"      IMAGING:     OUTSIDE CT 7/2023    ASSESSMENT:     66 y.o. male  with significant right-sided partial staghorn stone burden, neurogenic bladder managed with suprapubic tube    PLAN:     I had a very nice conversation with the patient and his wife. We discussed management of the patient's stone. We discussed percutaneous nephrolithotomy surgery as the gold standard for large stone burden. This allows the patient to have the least amount of surgical intervention with the highest rate of stone free clearance. This is certainly the surgery that would be preferred given the existing nephrostomy tube and the degree of stone seen on CT. However, this surgery is complicated as it is more aggressive with a higher risk profile. This higher risk is compounded by the patient's quadriplegia, history of autonomic dysreflexia, contractures and positioning, current valve and pulmonary disorders. It appears that the patient has been offered PCNL surgery which is being scheduled at [facility] at the current time. If the team is able to overcome cardiac and pulmonary clearances, I recommended that he proceed with the surgery as offered. He understands that at our institution PCNL surgery would be offered under a general anesthetic. However, I think given the degree of complexity here, I would recommend against PCNL surgery here at [facility]. If the [facility] team is not comfortable where the scheduling cannot proceed, I have discussed the offer of a referral to a fellowship trained Endo urology stone expert in [place]. Patient does have significant valve disease and at current the [facility] cardiothoracic team is deferring TAVR given the infection risk from the suprapubic, nephrostomy tube and stone burden. Certainly superinfection of the valve implant would be disastrous. However, even if PCNL surgery is successful and the nephrostomy and stones are removed, the patient will still have infection risk from the long-term indwelling suprapubic tube. Patient has an upcoming second opinion with [facility] cardiothoracic team.  Certainly their opinion will be valuable as there is increased cardiovascular risk for a PCNL surgery given the ongoing valve concerns. While I remain available to assist the patient with a third opinion referral, the patient and his wife are currently comfortable continuing the plan for management at [facility] given the recent opinion. I will be including the cardiothoracic team and palliative care team on today's notation.

## 2023-11-10 ENCOUNTER — OFFICE VISIT (OUTPATIENT)
Dept: CARDIAC SURGERY | Facility: CLINIC | Age: 63
End: 2023-11-10
Payer: COMMERCIAL

## 2023-11-10 VITALS
OXYGEN SATURATION: 97 % | WEIGHT: 168 LBS | DIASTOLIC BLOOD PRESSURE: 51 MMHG | SYSTOLIC BLOOD PRESSURE: 90 MMHG | HEART RATE: 73 BPM | TEMPERATURE: 96.9 F

## 2023-11-10 DIAGNOSIS — I35.0 SEVERE AORTIC STENOSIS: ICD-10-CM

## 2023-11-10 DIAGNOSIS — G82.50 QUADRIPLEGIA (HCC): Primary | ICD-10-CM

## 2023-11-10 PROBLEM — E78.2 MIXED DYSLIPIDEMIA: Status: ACTIVE | Noted: 2022-09-19

## 2023-11-10 PROBLEM — L97.911: Status: ACTIVE | Noted: 2018-11-21

## 2023-11-10 PROBLEM — R56.9 SEIZURE (HCC): Status: ACTIVE | Noted: 2020-06-09

## 2023-11-10 PROBLEM — D64.9 CHRONIC ANEMIA: Status: ACTIVE | Noted: 2018-09-26

## 2023-11-10 PROBLEM — N20.0 STAGHORN CALCULUS: Status: ACTIVE | Noted: 2023-10-30

## 2023-11-10 PROBLEM — Z86.79 HISTORY OF SUBARACHNOID HEMORRHAGE: Status: ACTIVE | Noted: 2023-07-18

## 2023-11-10 PROBLEM — L89.213 PRESSURE INJURY OF RIGHT HIP, STAGE 3 (HCC): Status: ACTIVE | Noted: 2020-01-20

## 2023-11-10 PROBLEM — K21.9 GERD (GASTROESOPHAGEAL REFLUX DISEASE): Status: ACTIVE | Noted: 2018-09-26

## 2023-11-10 PROBLEM — G90.4 AUTONOMIC DYSREFLEXIA: Status: ACTIVE | Noted: 2020-05-24

## 2023-11-10 PROBLEM — L89.90 DECUBITUS ULCER: Status: ACTIVE | Noted: 2020-01-20

## 2023-11-10 PROBLEM — E46 PROTEIN MALNUTRITION (HCC): Status: ACTIVE | Noted: 2019-02-01

## 2023-11-10 PROBLEM — M79.18 MYOFASCIAL MUSCLE PAIN: Status: ACTIVE | Noted: 2017-01-27

## 2023-11-10 PROBLEM — Z93.59 SUPRAPUBIC CATHETER (HCC): Status: ACTIVE | Noted: 2023-08-10

## 2023-11-10 PROBLEM — A49.9 POLYMICROBIAL BACTERIAL INFECTION: Status: ACTIVE | Noted: 2018-09-27

## 2023-11-10 PROCEDURE — 99205 OFFICE O/P NEW HI 60 MIN: CPT | Performed by: THORACIC SURGERY (CARDIOTHORACIC VASCULAR SURGERY)

## 2023-11-10 RX ORDER — MONTELUKAST SODIUM 10 MG/1
10 TABLET ORAL
COMMUNITY
Start: 2023-10-26

## 2023-11-10 RX ORDER — NEOMYCIN SULFATE, POLYMYXIN B SULFATE, HYDROCORTISONE 3.5; 10000; 1 MG/ML; [USP'U]/ML; MG/ML
3 SOLUTION/ DROPS AURICULAR (OTIC) AS NEEDED
COMMUNITY
Start: 2023-08-04

## 2023-11-10 RX ORDER — MINOCYCLINE HYDROCHLORIDE 50 MG/1
50 TABLET ORAL 2 TIMES DAILY
COMMUNITY
Start: 2023-11-10

## 2023-11-10 NOTE — PROGRESS NOTES
Consultation - Cardiothoracic Surgery   Glendy Dugan 66 y.o. male MRN: [id number]    Physician Requesting Consult: self referred     Reason for Consult / Principal Problem: Aortic stenosis, Non-Rheumatic    History of Present Illness: Glendy Dugan is a 66y.o. year old male who presents for initial outpatient surgical consultation for severe symptomatic aortic stenosis. 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 He has known aortic stenosis, severe in nature with bicuspid aortic valve. He has noted an increase in dyspnea with movement and when sitting up. He presents today with his wife Sanya Smyth. He uses a mouth piece attached to his wheelchair for mobility. He has no movement from his neck down. He needs a kidney operation for a stone but has been on hold since his aortic valve stenosis. He is a non smoker, non drinker, no illegal drug use.   He has an aide at home helping him    Past Medical History:  Past Medical History:   Diagnosis Date    Anemia     CHF (congestive heart failure) ([address])     Diabetes mellitus ([address])     Hypertension     Seizures (HCC)     Stroke ([address])    SAH x 2 due to HTN  CVA x 2 with post CVA seizure   Hx of MRSA from hip pressure ulcer  Left shoulder pain  Chronic left nephrostomy tube  Suprapubic catheter  Chronic osteomyelitis     Past Surgical History:   Past Surgical History:   Procedure Laterality Date    SPINE SURGERY     Bilateral hip surgery  Femurs cut bilaterally  Intrathecal pump    Family History:  Family History   Problem Relation Age of Onset    COPD Mother     Heart disease Mother      Social History:    Social History     Substance and Sexual Activity   Alcohol Use Not Currently     Social History Substance and Sexual Activity   Drug Use Never     Social History     Tobacco Use   Smoking Status Never   Smokeless Tobacco Never       Home Medications:   Prior to Admission medications    Medication Sig Start Date End Date Taking? Authorizing Provider   clotrimazole (MYCELEX) 10 mg jutsin Take 10 mg by mouth    Historical Provider, MD   famotidine (PEPCID) 40 MG tablet  8/28/23   Historical Provider, MD   gabapentin (NEURONTIN) 400 mg capsule Take 1,200 mg by mouth 3 (three) times a day 8/1/23   Historical Provider, MD   hydrOXYzine HCL (ATARAX) 25 mg tablet Take 25 mg by mouth daily as needed    Historical Provider, MD   losartan (COZAAR) 25 mg tablet Take 25 mg by mouth daily as needed    Historical Provider, MD   Magnesium 250 MG TABS Take 1 tablet by mouth every evening    Historical Provider, MD   Metformin HCl (RIOMET) 500 MG/5ML oral solution Take 500 mg by mouth in the morning 8/1/23   Historical Provider, MD   midodrine (PROAMATINE) 5 mg tablet Take 2.5 mg by mouth    Historical Provider, MD   Morphine Sulfate, Concentrate, 20 mg/mL concentrated solution Take 0.25 mL (5 mg total) by mouth every 2 (two) hours as needed (breathlessness) Max Daily Amount: 60 mg 9/6/23   Felecia Davis MD   multivitamin [facility]) TABS Take 1 tablet by mouth daily    Historical Provider, MD   naloxone (NARCAN) 4 mg/0.1 mL nasal spray Administer 1 spray into a nostril. If no response after 2-3 minutes, give another dose in the other nostril using a new spray. 9/6/23 9/5/24  Felecia Davis MD   rosuvastatin (CRESTOR) 20 MG tablet Take 20 mg by mouth daily 8/2/23 8/1/24  Historical Provider, MD   venlafaxine (EFFEXOR) 100 MG tablet Take 100 mg by mouth Three times a day    Historical Provider, MD       Allergies: Allergies   Allergen Reactions    Dopamine Shortness Of Breath    Cefepime Hives     clotilde.  piperacillin    Ciprofloxacin GI Intolerance    Daptomycin Other (See Comments)     elevated cpk,clotilde vanco Review of Systems:     Review of Systems   Constitutional:  Positive for activity change and fatigue. HENT: Negative. Eyes: Negative. Respiratory:  Positive for shortness of breath. Cardiovascular: Negative. Gastrointestinal: Negative. Endocrine: Negative. Genitourinary:         Suprapubic catheter   Musculoskeletal:  Positive for arthralgias, gait problem and myalgias. Skin:  Positive for wound. As above  Pressure ulcers on bilateral elbows  Right hip pressure ulcer    Allergic/Immunologic: Negative. Hematological: Negative. Psychiatric/Behavioral: Negative. Vital Signs:     Vitals:    11/10/23 1410   BP: 90/51   BP Location: Left arm   Patient Position: Sitting   Cuff Size: Standard   Pulse: 73   Temp: (!) 96.9 °F (36.1 °C)   TempSrc: Tympanic   SpO2: 97%   Weight: 76.2 kg (168 lb)       Physical Exam:     Physical Exam  Constitutional:       General: He is not in acute distress. Appearance: He is normal weight. He is ill-appearing. He is not toxic-appearing. HENT:      Head: Normocephalic and atraumatic. Nose: Nose normal.      Mouth/Throat:      Mouth: Mucous membranes are moist.      Pharynx: Oropharynx is clear. Eyes:      General: No scleral icterus. Right eye: No discharge. Left eye: No discharge. Extraocular Movements: Extraocular movements intact. Conjunctiva/sclera: Conjunctivae normal.      Pupils: Pupils are equal, round, and reactive to light. Neck:      Vascular: Carotid bruit present. Cardiovascular:      Rate and Rhythm: Normal rate. Pulses: Normal pulses. Heart sounds: Murmur heard. Pulmonary:      Effort: Pulmonary effort is normal. No respiratory distress. Breath sounds: Normal breath sounds. No wheezing or rales. Abdominal:      General: Abdomen is flat. Bowel sounds are normal. There is no distension. Palpations: Abdomen is soft. Tenderness: There is no abdominal tenderness. Musculoskeletal:         General: Signs of injury present. Cervical back: Normal range of motion and neck supple. Right lower leg: Edema present. Left lower leg: Edema present. Skin:     General: Skin is warm. Findings: No bruising or lesion. Neurological:      Mental Status: He is alert and oriented to person, place, and time. Sensory: Sensory deficit present. Motor: Weakness present. Coordination: Coordination abnormal.      Gait: Gait abnormal.   Psychiatric:         Mood and Affect: Mood normal.         Behavior: Behavior normal.         Thought Content: Thought content normal.         Judgment: Judgment normal.         Imaging Studies:     Echocardiogram:   Normal LV cavity size. Mild concentric LVH with prominent basal septal   hypertrophy. Hyperdynamic LV systolic function with estimated LVEF > 65 %     Grade II diastolic dysfunction. Mildly dilated left atrium. Normal RV size and function. Heavily calcified aortic valve with reduced cusp excursion. Severe aortic   stenosis [peak velocity 4.4 m/s, mean gradient 42 mmHg, GREGORIO 0.6 cm², DI   0.1]. No aortic regurgitation. Left Ventricle   Left ventricle is normal in size. Mild concentric LVH with prominent basal septal hypertrophy. Systolic function is hyperdynamic with an ejection fraction over 65%. Wall motion is within normal limits. There is grade II (moderate) diastolic dysfunction. Right Ventricle   Right ventricle cavity is normal. Systolic function is normal.     Left Atrium   Left atrium cavity is mildly dilated. Right Atrium   Right atrium cavity is normal.     IVC/SVC   The inferior vena cava demonstrates a diameter of <=21 mm and collapses >50%; therefore, the right atrial pressure is estimated at 0-5 mmHg. Mitral Valve   Mitral valve structure is normal. There is no regurgitation or stenosis. Tricuspid Valve   Tricuspid valve structure is normal. There is trace regurgitation.  There is no evidence of tricuspid valve stenosis. Aortic Valve   Heavily calcified aortic valve with reduced cusp excursion. There is no regurgitation. Severe aortic stenosis [peak velocity 4.4 m/s, mean gradient 42 mmHg, GREGORIO 0.6 cm², DI 0.1]. Pulmonic Valve   The pulmonic valve was not well visualized. There is trace regurgitation. There is no evidence of pulmonic valve stenosis. Ascending Aorta   The aorta appears normal in size. Pericardium   There is no pericardial effusion. I have personally reviewed pertinent reports. TAVR evaluation Assessment:     [address]: III    Aortic Stenosis Stage: D1    5 Meter Walk Test: can not perform    KCCQ-12 completed        Assessment:  Patient Active Problem List    Diagnosis Date Noted    Hydronephrosis with obstructing calculus 10/25/2023    Severe aortic stenosis 09/06/2023    Quadriplegia ([address]) 09/06/2023    Shortness of breath 09/06/2023    Palliative care by specialist 09/06/2023    Goals of care, counseling/discussion 09/06/2023    Other insomnia 09/06/2023     Severe aortic stenosis; Ongoing TAVR workup    Plan:  See about physician attestation. High risk for TAVR procedure given multiple comorbidity. Shared decision-making encounter occurred during this visit. Additionally, heart team evaluation of suitability for surgical replacement has been completed. Glendy Hur was comfortable with our recommendations, and their questions were answered to their satisfaction. We will continue to evaluate the patient, with a final surgical recommendation pending the above work up. Thank you for allowing us to participate in the care of this patient. The patient recently had a screening colonoscopy in 2018. Therefore GI referral is not indicated at this time. SIGNATURE: Gisela Andrew  DATE: November 10, 2023  TIME: 2:46 PM    * This note was completed in part utilizing [id number] fluency direct voice recognition software. Grammatical errors, random word insertion, spelling mistakes, and incomplete sentences may be an occasional consequence of the system secondary to software limitations, ambient noise and hardware issues. At the time of dictation, efforts were made to edit, clarify and /or correct errors. Please read the chart carefully and recognize, using context, where substitutions have occurred. If you have any questions or concerns about the context, text or information contained within the body of this dictation, please contact myself, the provider, for further clarification.

## 2023-11-13 ENCOUNTER — TELEPHONE (OUTPATIENT)
Dept: PALLIATIVE MEDICINE | Facility: CLINIC | Age: 63
End: 2023-11-13

## 2023-11-13 NOTE — TELEPHONE ENCOUNTER
Per spouse went to Cardiology and Urology appointments and no DR would like to do surgery that was spoken about visit , spouse would like a call back from [phone] [facility] .

## 2023-11-14 ENCOUNTER — TELEPHONE (OUTPATIENT)
Dept: PALLIATIVE MEDICINE | Facility: CLINIC | Age: 63
End: 2023-11-14

## 2023-11-14 NOTE — TELEPHONE ENCOUNTER
Spoke with Reno Resendez via phone. Reviewed their discussions with urology and cardiac surgery. At this time, Flavia Pelaez is not a candidate for further interventional management. Per cardiology, approx prognosis of 1-2 years was given. Reno Resendez expresses that she and Flavia Pelaez are both working through a combination of relief at obtaining definitive answers, and sadness at his reported prognosis. At this time they are looking to continue with medical optimization, hospitalization, and outpatient follow up. Discussed hospice cares at length, which we will defer given the above goals. Separately, continued discussions on roxanol for dyspnea. He will be trialing a dose increase (10mg) to see if this improves his travel tolerance. Sukumar Bello to call if any concerns or challenges with this dosing change. Will follow up on 12/20 but may call with any other questions or concerns.

## 2023-12-20 ENCOUNTER — OFFICE VISIT (OUTPATIENT)
Dept: PALLIATIVE MEDICINE | Facility: CLINIC | Age: 63
End: 2023-12-20
Payer: COMMERCIAL

## 2023-12-20 VITALS
OXYGEN SATURATION: 99 % | HEART RATE: 77 BPM | TEMPERATURE: 97.2 F | SYSTOLIC BLOOD PRESSURE: 100 MMHG | DIASTOLIC BLOOD PRESSURE: 60 MMHG

## 2023-12-20 DIAGNOSIS — Z93.59 SUPRAPUBIC CATHETER (HCC): ICD-10-CM

## 2023-12-20 DIAGNOSIS — N13.2 HYDRONEPHROSIS WITH OBSTRUCTING CALCULUS: ICD-10-CM

## 2023-12-20 DIAGNOSIS — I51.9 HEART DISEASE: ICD-10-CM

## 2023-12-20 DIAGNOSIS — Z71.89 GOALS OF CARE, COUNSELING/DISCUSSION: Primary | ICD-10-CM

## 2023-12-20 DIAGNOSIS — I35.0 NONRHEUMATIC AORTIC VALVE STENOSIS: ICD-10-CM

## 2023-12-20 DIAGNOSIS — R06.00 DYSPNEA, UNSPECIFIED TYPE: ICD-10-CM

## 2023-12-20 PROCEDURE — 99215 OFFICE O/P EST HI 40 MIN: CPT | Performed by: INTERNAL MEDICINE

## 2023-12-20 RX ORDER — MORPHINE SULFATE 100 MG/5ML
SOLUTION, CONCENTRATE ORAL
Qty: 30 ML | Refills: 0 | Status: SHIPPED | OUTPATIENT
Start: 2023-12-20

## 2023-12-20 NOTE — PROGRESS NOTES
Palliative and Supportive Care   Herminio Roth 63 y.o. male [id number]    Assessment/Plan:  1. Goals of care, counseling/discussion    2. Heart disease    3. Dyspnea, unspecified type    4. Hydronephrosis with obstructing calculus    5. Suprapubic catheter (HCC)    6. Nonrheumatic aortic valve stenosis      Will increase morphine frequency to q1h PRN, with a likely plan to take 10mg for travel and 5mg doses before meals, for example.  Discussed hospice again and patient is still interested in doctors visits and emergency hospitalizations.  That being said, he and Patty are looking to start to minimize care team members as safely.  Advised to talk to PCP about mgmt of cardiac meds and urostomy supplies.  Advised that if bleeding recurs and/or is associated with pain, mental status changes, or any other concerning symptoms he should be evaluated.  May of course call for any questions or concerns.  Follow up in 3 months or as needed.     Requested Prescriptions     Signed Prescriptions Disp Refills    Morphine Sulfate, Concentrate, 20 mg/mL concentrated solution 30 mL 0     Sig: Take 5mg - 10mg by mouth every 1 hour as needed for breathlessness     Medications Discontinued During This Encounter   Medication Reason    Morphine Sulfate, Concentrate, 20 mg/mL concentrated solution        Representatives have queried the patient's controlled substance dispensing history in the Prescription Drug Monitoring Program in compliance with regulations before I have prescribed any controlled substances.  The prescription history is consistent with prescribed therapy and our practice policies.      I have spent a total time of 31 minutes on 12/20/23 in caring for this patient including Risks and benefits of tx options, Instructions for management, Patient and family education, Documenting in the medical record, Reviewing / ordering tests, medicine, procedures  , and Obtaining or reviewing history  .     No follow-ups on  file.    Subjective:   Chief Complaint  Follow up visit for:  symptom management, assessment of goals of care  HPI     Herminio Roth is a 63 y.o. male with aortic valve stenosis, and staghorn renal calculus both of which are mutually prohibiting tx of the other in the setting of his longstanding quadraplegia who presents for follow up.  Since our last visit, he has been able to do more in terms of traveling and spending family time. His son installed a ramp for which he is very grateful.  Morphine @ 10mg dose has worked quite well to enable his travel needs.  Does find sitting at the table to be associated with similar breathlessness and inquires about additional roxanol doses.  Denies constipating side effect or oversedation.  Otherwise reports a transient issue with blood in his urostomy that ended up self-limiting.  Revisited goals of care and hospice services again today as well.     The following portions of the medical history were reviewed: past medical history, problem list, medication list, and social history.    Current Outpatient Medications:     BD PosiFlush 0.9 % SOLN, , Disp: , Rfl:     clotrimazole (MYCELEX) 10 mg justin, Take 10 mg by mouth As needed, Disp: , Rfl:     famotidine (PEPCID) 40 MG tablet, , Disp: , Rfl:     gabapentin (NEURONTIN) 400 mg capsule, Take 1,200 mg by mouth 3 (three) times a day, Disp: , Rfl:     hydrOXYzine HCL (ATARAX) 25 mg tablet, Take 25 mg by mouth daily as needed As needed, Disp: , Rfl:     losartan (COZAAR) 25 mg tablet, Take 25 mg by mouth daily as needed As needed, Disp: , Rfl:     Magnesium 250 MG TABS, Take 1 tablet by mouth every evening, Disp: , Rfl:     Metformin HCl (RIOMET) 500 MG/5ML oral solution, Take 500 mg by mouth in the morning, Disp: , Rfl:     midodrine (PROAMATINE) 5 mg tablet, Take 2.5 mg by mouth As needed, Disp: , Rfl:     minocycline (DYNACIN) 50 MG tablet, Take 50 mg by mouth 2 (two) times a day As needed, Disp: , Rfl:     montelukast  (SINGULAIR) 10 mg tablet, Take 10 mg by mouth As needed, Disp: , Rfl:     Morphine Sulfate, Concentrate, 20 mg/mL concentrated solution, Take 5mg - 10mg by mouth every 1 hour as needed for breathlessness, Disp: 30 mL, Rfl: 0    multivitamin (THERAGRAN) TABS, Take 1 tablet by mouth daily, Disp: , Rfl:     naloxone (NARCAN) 4 mg/0.1 mL nasal spray, Administer 1 spray into a nostril. If no response after 2-3 minutes, give another dose in the other nostril using a new spray., Disp: 1 each, Rfl: 1    neomycin-polymyxin-hydrocortisone (CORTISPORIN) 1 % SOLN, Administer 3 drops into the left ear if needed As needed, Disp: , Rfl:     rosuvastatin (CRESTOR) 20 MG tablet, Take 20 mg by mouth daily, Disp: , Rfl:     venlafaxine (EFFEXOR) 100 MG tablet, Take 100 mg by mouth Three times a day, Disp: , Rfl:   Review of Systems    All other systems negative    Objective:  Vital Signs  /60 (BP Location: Right arm, Patient Position: Sitting, Cuff Size: Standard)   Pulse 77   Temp (!) 97.2 °F (36.2 °C) (Temporal)   SpO2 99%    Physical Exam    Constitutional: Appears chronically ill. In no acute distress.   Head: Normocephalic and atraumatic.   Eyes: EOM are normal. Right eye exhibits no discharge. Left eye exhibits no discharge. No scleral icterus.   Pulmonary/Chest: Effort normal. No stridor. No respiratory distress.   Abdominal: No distension. Urostomy bag.   Musculoskeletal: No edema. Contractures.  In wheelchair.   Neurological: Alert, oriented and appropriately conversant.   Skin: Skin is dry, not diaphoretic.   Psychiatric: Displays a normal mood and affect. Behavior, judgement and thought content appear normal.   Vitals reviewed.

## 2024-01-08 ENCOUNTER — TELEPHONE (OUTPATIENT)
Dept: PALLIATIVE MEDICINE | Facility: CLINIC | Age: 64
End: 2024-01-08

## 2024-01-08 NOTE — TELEPHONE ENCOUNTER
Pt spouse called in stated, pt is getting dental work done and would be needing a clearance. Please advice

## 2024-01-09 ENCOUNTER — TELEPHONE (OUTPATIENT)
Dept: PALLIATIVE MEDICINE | Facility: CLINIC | Age: 64
End: 2024-01-09

## 2024-01-09 NOTE — TELEPHONE ENCOUNTER
Pts spouse (Patty) called in states Cuate has a broken tooth. Patty states he is in a lot of pain due to the tooth. She was wondering if she can have some assistance looking for an office that can see him asap. Stated most of the offices she called were over a week or so out. States she doesn't think he can wait long due to his condition. Please advice. Call back number [phone].

## 2024-02-15 ENCOUNTER — TELEPHONE (OUTPATIENT)
Dept: PALLIATIVE MEDICINE | Facility: CLINIC | Age: 64
End: 2024-02-15

## 2024-02-15 NOTE — TELEPHONE ENCOUNTER
Patient's spouse, Patty called seeking guidance. She reports patient is experiencing spiking blood sugars no matter what he eats (she informed me patient no longer takes metformin due to issues with low blood sugar.) He is also experiencing low blood pressure and has aches in pains in his hands. No fever reported. Patty [phone]

## 2024-02-26 ENCOUNTER — DOCUMENTATION (OUTPATIENT)
Dept: PALLIATIVE MEDICINE | Facility: CLINIC | Age: 64
End: 2024-02-26

## 2024-02-26 NOTE — PROGRESS NOTES
"  2/26/2024 3:06 PM -  Herminio Roth's chart and case were reviewed by Stefan Roberts MD.     Spoke to Patty today in follow up.  They have modified Cuate' diet and fluid intake which seems to have yielded reasonable results.  He is reported as a little more \"even\" which Patty appreciates.  Advised her that I would be away from the office for about a week, but I have team members available if needed.  She is appreciative and will follow up with me pending results of his blood work.           Stefan Roberts MD  Palliative and Supportive Care  Clinic/Answering Service: [phone]  You can find me on [employer]!    "

## 2024-03-05 ENCOUNTER — TELEPHONE (OUTPATIENT)
Dept: PALLIATIVE MEDICINE | Facility: CLINIC | Age: 64
End: 2024-03-05

## 2024-03-21 RX ORDER — AMOXICILLIN 875 MG/1
TABLET, COATED ORAL
COMMUNITY
Start: 2024-01-11

## 2024-03-21 RX ORDER — BLOOD SUGAR DIAGNOSTIC
STRIP MISCELLANEOUS
COMMUNITY
Start: 2024-02-08

## 2024-03-22 ENCOUNTER — TELEPHONE (OUTPATIENT)
Dept: PALLIATIVE MEDICINE | Facility: CLINIC | Age: 64
End: 2024-03-22

## 2024-03-22 NOTE — TELEPHONE ENCOUNTER
Lab Results   Component Value Date    HGBA1C 8 7 (H) 02/05/2020       No results for input(s): POCGLU in the last 72 hours  Blood Sugar Average: Last 72 hrs:  · Home regimen of 20 units of Lantus at bedtime  · Q i d  Accu-Cheks and sliding scale insulin  · Hypoglycemia protocol  Last appointment: 12/20/2023    Next scheduled appointment: 3/26/2024    Pharmacy: [facility]       Pt called in stated he spoke with his PCP about getting a script for percocet for his pain. His PCP refuse and stated he can not get pain medication from to providers. So pt is wondering if he can get a script his pain is currently 8/10. Please advice

## 2024-03-26 ENCOUNTER — OFFICE VISIT (OUTPATIENT)
Dept: PALLIATIVE MEDICINE | Facility: CLINIC | Age: 64
End: 2024-03-26
Payer: COMMERCIAL

## 2024-03-26 VITALS
TEMPERATURE: 97.4 F | SYSTOLIC BLOOD PRESSURE: 80 MMHG | HEART RATE: 69 BPM | OXYGEN SATURATION: 99 % | DIASTOLIC BLOOD PRESSURE: 50 MMHG

## 2024-03-26 DIAGNOSIS — G82.50 QUADRIPLEGIA (HCC): ICD-10-CM

## 2024-03-26 DIAGNOSIS — L89.029: ICD-10-CM

## 2024-03-26 DIAGNOSIS — E46 PROTEIN MALNUTRITION (HCC): ICD-10-CM

## 2024-03-26 DIAGNOSIS — L89.213 PRESSURE INJURY OF RIGHT HIP, STAGE 3 (HCC): ICD-10-CM

## 2024-03-26 DIAGNOSIS — G89.0 CENTRAL PAIN SYNDROME: ICD-10-CM

## 2024-03-26 DIAGNOSIS — L97.911: ICD-10-CM

## 2024-03-26 DIAGNOSIS — N13.2 HYDRONEPHROSIS WITH OBSTRUCTING CALCULUS: ICD-10-CM

## 2024-03-26 DIAGNOSIS — R06.00 DYSPNEA, UNSPECIFIED TYPE: ICD-10-CM

## 2024-03-26 DIAGNOSIS — I51.9 HEART DISEASE: ICD-10-CM

## 2024-03-26 DIAGNOSIS — T14.8XXA MULTIPLE WOUNDS OF SKIN: Primary | ICD-10-CM

## 2024-03-26 DIAGNOSIS — I35.0 SEVERE AORTIC STENOSIS: ICD-10-CM

## 2024-03-26 DIAGNOSIS — L89.90 DECUBITUS ULCER: ICD-10-CM

## 2024-03-26 DIAGNOSIS — Z51.5 PALLIATIVE CARE BY SPECIALIST: ICD-10-CM

## 2024-03-26 PROCEDURE — 99215 OFFICE O/P EST HI 40 MIN: CPT | Performed by: INTERNAL MEDICINE

## 2024-03-26 RX ORDER — MORPHINE SULFATE 100 MG/5ML
SOLUTION, CONCENTRATE ORAL
Qty: 30 ML | Refills: 0 | Status: SHIPPED | OUTPATIENT
Start: 2024-03-26

## 2024-03-26 NOTE — PROGRESS NOTES
Palliative and Supportive Care   Herminio Roth 63 y.o. male [id number]    Assessment/Plan:  1. Multiple wounds of skin    2. Heart disease    3. Dyspnea, unspecified type    4. Decubitus ulcer of left elbow    5. Decubitus ulcer    6. Pressure injury of right hip, stage 3 ([facility])    7. Skin ulcer of lower leg, limited to breakdown of skin, right (HCC)    8. Central pain syndrome    9. Palliative care by specialist    10. Quadriplegia (HCC)    11. Protein malnutrition ([facility])    12. Severe aortic stenosis    13. Hydronephrosis with obstructing calculus      Consult to [facility] for home wound care assessment.  Discussed using MSIR to manage pain concerns in tandem with breathlessness and we are in agreement that one agent is most efficient.  Will trial MSIR 15mg for pain, and 10mg for breathlessness. Would like to switch his neph tube care to [facility] so routine consult to IR placed.  Goal is to continue to manage at home as best as possible. Will follow up in 2-3 months.     Requested Prescriptions     Signed Prescriptions Disp Refills    Morphine Sulfate, Concentrate, 20 mg/mL concentrated solution 30 mL 0     Sig: Take 10mg PO as needed every 1 hour for breathlessness, or 15mg PO as needed every 2 hours for pain     Medications Discontinued During This Encounter   Medication Reason    Morphine Sulfate, Concentrate, 20 mg/mL concentrated solution        Representatives have queried the patient's controlled substance dispensing history in the Prescription Drug Monitoring Program in compliance with regulations before I have prescribed any controlled substances.  The prescription history is consistent with prescribed therapy and our practice policies.      I have spent a total time of 35 minutes on 03/27/24 in caring for this patient including Risks and benefits of tx options, Instructions for management, Patient and family education, Risk factor reductions, Counseling / Coordination of care, Documenting in the medical  record, Reviewing / ordering tests, medicine, procedures  , and Obtaining or reviewing history  .     No follow-ups on file.    Subjective:   Chief Complaint  Follow up visit for:  symptom management  HPI     Herminio Roth is a 63 y.o. male with complex medical hx of AV stenosis as well as staghorn renal calculus with neph tube in the setting of quadriplegia.  Dyspnea has been well controlled with msir 10mg.  He reports an increase in his chronic MSK pain since last visit.  Was taking NSAIDs but stopped after GI bleeding. Previously he had received opioid therapy for this and inquires about available options for mgmt.  Other concerns at this visit include ongoing skin breakdown which spouse is having to spend significant time managing, and a desire to continue to transition his cares to [facility].      The following portions of the medical history were reviewed: past medical history, problem list, medication list, and social history.    Current Outpatient Medications:     Accu-Chek Guide test strip, , Disp: , Rfl:     amoxicillin (AMOXIL) 875 mg tablet, , Disp: , Rfl:     BD PosiFlush 0.9 % SOLN, , Disp: , Rfl:     clotrimazole (MYCELEX) 10 mg justin, Take 10 mg by mouth As needed, Disp: , Rfl:     famotidine (PEPCID) 40 MG tablet, , Disp: , Rfl:     gabapentin (NEURONTIN) 400 mg capsule, Take 1,200 mg by mouth 3 (three) times a day, Disp: , Rfl:     hydrOXYzine HCL (ATARAX) 25 mg tablet, Take 25 mg by mouth daily as needed As needed, Disp: , Rfl:     losartan (COZAAR) 25 mg tablet, Take 25 mg by mouth daily as needed As needed, Disp: , Rfl:     Magnesium 250 MG TABS, Take 1 tablet by mouth every evening, Disp: , Rfl:     Metformin HCl (RIOMET) 500 MG/5ML oral solution, Take 500 mg by mouth in the morning, Disp: , Rfl:     midodrine (PROAMATINE) 5 mg tablet, Take 2.5 mg by mouth As needed, Disp: , Rfl:     minocycline (DYNACIN) 50 MG tablet, Take 50 mg by mouth 2 (two) times a day As needed, Disp: , Rfl:      montelukast (SINGULAIR) 10 mg tablet, Take 10 mg by mouth As needed, Disp: , Rfl:     Morphine Sulfate, Concentrate, 20 mg/mL concentrated solution, Take 10mg PO as needed every 1 hour for breathlessness, or 15mg PO as needed every 2 hours for pain, Disp: 30 mL, Rfl: 0    multivitamin (THERAGRAN) TABS, Take 1 tablet by mouth daily, Disp: , Rfl:     naloxone (NARCAN) 4 mg/0.1 mL nasal spray, Administer 1 spray into a nostril. If no response after 2-3 minutes, give another dose in the other nostril using a new spray., Disp: 1 each, Rfl: 1    neomycin-polymyxin-hydrocortisone (CORTISPORIN) 1 % SOLN, Administer 3 drops into the left ear if needed As needed, Disp: , Rfl:     rosuvastatin (CRESTOR) 20 MG tablet, Take 20 mg by mouth daily, Disp: , Rfl:     venlafaxine (EFFEXOR) 100 MG tablet, Take 100 mg by mouth Three times a day, Disp: , Rfl:       Objective:  Vital Signs  BP (!) 80/50 (BP Location: Right arm, Patient Position: Sitting, Cuff Size: Standard)   Pulse 69   Temp (!) 97.4 °F (36.3 °C) (Temporal)   SpO2 99%    Physical Exam    Constitutional: Appears chronically ill. In no acute distress.   Head: Normocephalic and atraumatic.   Eyes: EOM are normal. Right eye exhibits no discharge. Left eye exhibits no discharge. No scleral icterus.   Pulmonary/Chest: Effort normal. No stridor. No respiratory distress.   Abdominal: No distension.   Musculoskeletal: No edema. +contractures.   Neurological: Alert, oriented and appropriately conversant.   Skin: Skin is dry, not diaphoretic. I did not personally assess his skin breakdown or tube sites today.   Psychiatric: Displays a normal mood and affect. Behavior, judgement and thought content appear normal.   Vitals reviewed.

## 2024-03-27 ENCOUNTER — HOME HEALTH ADMISSION (OUTPATIENT)
Dept: HOME HEALTH SERVICES | Facility: HOME HEALTHCARE | Age: 64
End: 2024-03-27
Payer: COMMERCIAL

## 2024-03-28 ENCOUNTER — HOME CARE VISIT (OUTPATIENT)
Dept: HOME HEALTH SERVICES | Facility: HOME HEALTHCARE | Age: 64
End: 2024-03-28
Payer: COMMERCIAL

## 2024-03-28 ENCOUNTER — PREP FOR PROCEDURE (OUTPATIENT)
Dept: INTERVENTIONAL RADIOLOGY/VASCULAR | Facility: CLINIC | Age: 64
End: 2024-03-28

## 2024-03-28 DIAGNOSIS — G82.50 QUADRIPLEGIA (HCC): Primary | ICD-10-CM

## 2024-03-28 PROCEDURE — G0299 HHS/HOSPICE OF RN EA 15 MIN: HCPCS

## 2024-03-28 PROCEDURE — 400013 VN SOC

## 2024-03-29 ENCOUNTER — TELEPHONE (OUTPATIENT)
Age: 64
End: 2024-03-29

## 2024-03-29 ENCOUNTER — HOME CARE VISIT (OUTPATIENT)
Dept: HOME HEALTH SERVICES | Facility: HOME HEALTHCARE | Age: 64
End: 2024-03-29
Payer: COMMERCIAL

## 2024-03-29 DIAGNOSIS — L89.213 PRESSURE INJURY OF RIGHT HIP, STAGE 3 (HCC): Primary | ICD-10-CM

## 2024-03-29 PROCEDURE — 10330064 SPONGE, EXCILON SPLIT DRN STR 4"X4" (2/P

## 2024-03-29 NOTE — CASE COMMUNICATION
Ship to   Clinician  xx      Ordering MD Stefan Roberts,        Wound 1   Nephrostomy tube and sp torres     Partial   XX   Frequency   daily      Drain sponges 4x4 split [id number] ....4

## 2024-03-29 NOTE — TELEPHONE ENCOUNTER
Visiting Nurse Shelley Delvalle is requesting script for Santyl ointment for wound treatment.  Patient has several wounds and [facility] will put in referral for wound care. Preferred Pharmacy is [facility]  in [place].

## 2024-04-01 ENCOUNTER — HOME CARE VISIT (OUTPATIENT)
Dept: HOME HEALTH SERVICES | Facility: HOME HEALTHCARE | Age: 64
End: 2024-04-01
Payer: COMMERCIAL

## 2024-04-01 PROCEDURE — G0299 HHS/HOSPICE OF RN EA 15 MIN: HCPCS

## 2024-04-02 ENCOUNTER — HOME CARE VISIT (OUTPATIENT)
Dept: HOME HEALTH SERVICES | Facility: HOME HEALTHCARE | Age: 64
End: 2024-04-02
Payer: COMMERCIAL

## 2024-04-02 VITALS
DIASTOLIC BLOOD PRESSURE: 92 MMHG | HEART RATE: 66 BPM | TEMPERATURE: 97.3 F | RESPIRATION RATE: 20 BRPM | SYSTOLIC BLOOD PRESSURE: 168 MMHG | OXYGEN SATURATION: 98 %

## 2024-04-02 NOTE — CASE COMMUNICATION
Ship to Patient Home   xx       Ordering MD  -  Dr. Stefan Roberts,       Wound 1 Nephrostomy tube and sp torres Partial XX Frequency daily       Drain sponges 4x4 split [id number] ....25

## 2024-04-02 NOTE — CASE COMMUNICATION
[facility] has admitted your patient to Home Health service with the following disciplines:      SN only  This report is informational only, no response is needed  Primary focus of home health care  Integumentary  Patient stated goals of care  To assist with wound management    Anticipated visit pattern 1x week x 1 week then 2x week x 5 weeks then 1x week x 3 weeks and 3 prn visits. Next visit date Monday 4/1/24  See medication list  - meds in home differ from AVS -  Meds not on list -  Fero Sul 325mg daily -  Potassium gluconate 595mg daily -  Zinc 50mg daily -  Captopril 12.5mg 4x day as needed for elevated bp readings  - Not taking Metformin -  Takes Colestipol 1 gram 2 tabs daily only as needed -  Takes Losartan 50mg not 25mg and only takes daily as needed for elevated bp readings - To call pcp to clarify meds  Significant clinical findings - Has multiple wound s mostly due to pressure -  has stage 1 pressure area on R lateral foot -  stage 2 PU on R shoulder - stage 3 PU on L elbow and R side of abdomen and DTI on L elbow and unstageable PU on R lateral mid back  - Patient is sob when sits up straight in wheelchair and has pain in neck and shoulders and scapula - ordered Morphine for pain and sob -  BP readings are high at times ans other times has low readings -  takes meds as needed dependi ng on readings    Potential barriers to goal achievement -  Limited mobility due to quadraplegia and inability to tolerate lying on L side making repositioning difficult  Other pertinent information -  Has nephrostomy tube on R side managed by IR and has abebe torres -  wife changes sp torres every 3 to 4 weeks    Thank you for allowing us to participate in the care of your patient.

## 2024-04-03 ENCOUNTER — HOME CARE VISIT (OUTPATIENT)
Dept: HOME HEALTH SERVICES | Facility: HOME HEALTHCARE | Age: 64
End: 2024-04-03
Payer: COMMERCIAL

## 2024-04-03 NOTE — CASE COMMUNICATION
I had originally ordered wound care to his wound on L elbow and R side of abdomen to use sliver alginate after cleansing and applying foam dressing and applying a dcd. The only one this was not ordered for was the one on his mid R side of his back in which we started using Santyl. When I saw patient this week his wife felt the silver alginate made wounds drain too much so she changed wound care back to just cleansing with wound cleanser  and applying foam dressing and covering with dcd to areas on L elbow and R side of abdomen. I will be sending you an order to reflect this change in wound care. I plan on referring our wound care nurse next week as she is on vacation this week and see if she has any other suggestions for his wound care. Please contact me with any questions or concerns. Thank you.

## 2024-04-05 ENCOUNTER — HOME CARE VISIT (OUTPATIENT)
Dept: HOME HEALTH SERVICES | Facility: HOME HEALTHCARE | Age: 64
End: 2024-04-05
Payer: COMMERCIAL

## 2024-04-05 VITALS
DIASTOLIC BLOOD PRESSURE: 90 MMHG | TEMPERATURE: 97.5 F | HEART RATE: 66 BPM | SYSTOLIC BLOOD PRESSURE: 138 MMHG | RESPIRATION RATE: 20 BRPM | OXYGEN SATURATION: 96 %

## 2024-04-05 VITALS
SYSTOLIC BLOOD PRESSURE: 110 MMHG | RESPIRATION RATE: 20 BRPM | DIASTOLIC BLOOD PRESSURE: 82 MMHG | HEART RATE: 76 BPM | TEMPERATURE: 98.7 F | OXYGEN SATURATION: 98 %

## 2024-04-05 PROCEDURE — G0299 HHS/HOSPICE OF RN EA 15 MIN: HCPCS

## 2024-04-08 ENCOUNTER — HOME CARE VISIT (OUTPATIENT)
Dept: HOME HEALTH SERVICES | Facility: HOME HEALTHCARE | Age: 64
End: 2024-04-08
Payer: COMMERCIAL

## 2024-04-08 VITALS
DIASTOLIC BLOOD PRESSURE: 97 MMHG | RESPIRATION RATE: 16 BRPM | TEMPERATURE: 97.3 F | SYSTOLIC BLOOD PRESSURE: 130 MMHG | HEART RATE: 76 BPM | OXYGEN SATURATION: 100 %

## 2024-04-08 PROCEDURE — G0299 HHS/HOSPICE OF RN EA 15 MIN: HCPCS

## 2024-04-09 PROCEDURE — G0180 MD CERTIFICATION HHA PATIENT: HCPCS | Performed by: INTERNAL MEDICINE

## 2024-04-12 ENCOUNTER — HOME CARE VISIT (OUTPATIENT)
Dept: HOME HEALTH SERVICES | Facility: HOME HEALTHCARE | Age: 64
End: 2024-04-12
Payer: COMMERCIAL

## 2024-04-12 PROCEDURE — G0299 HHS/HOSPICE OF RN EA 15 MIN: HCPCS

## 2024-04-15 ENCOUNTER — HOME CARE VISIT (OUTPATIENT)
Dept: HOME HEALTH SERVICES | Facility: HOME HEALTHCARE | Age: 64
End: 2024-04-15
Payer: COMMERCIAL

## 2024-04-15 VITALS
SYSTOLIC BLOOD PRESSURE: 138 MMHG | RESPIRATION RATE: 18 BRPM | TEMPERATURE: 98.7 F | DIASTOLIC BLOOD PRESSURE: 93 MMHG | OXYGEN SATURATION: 98 % | HEART RATE: 79 BPM

## 2024-04-15 VITALS
HEART RATE: 68 BPM | TEMPERATURE: 97.4 F | RESPIRATION RATE: 18 BRPM | DIASTOLIC BLOOD PRESSURE: 70 MMHG | SYSTOLIC BLOOD PRESSURE: 111 MMHG | OXYGEN SATURATION: 99 %

## 2024-04-15 PROCEDURE — G0299 HHS/HOSPICE OF RN EA 15 MIN: HCPCS

## 2024-04-15 PROCEDURE — 10330064 SPONGE, GAUZE 8PLY N/S 4"X4" (200/PK 20P

## 2024-04-15 NOTE — CASE COMMUNICATION
Ship to   Clinician XX      MD ordering   Dr. Stefan Roberts MD      Wound 1  Multiple wounds -  L elbow, R mid back, R side of abdomen . R shoulder      Full  XX Frequency daily       Gauze 4x4 N/S 200 4x4s per unit  [id number] ....1

## 2024-04-18 ENCOUNTER — TELEPHONE (OUTPATIENT)
Dept: HOME HEALTH SERVICES | Facility: HOME HEALTHCARE | Age: 64
End: 2024-04-18

## 2024-04-19 ENCOUNTER — HOME CARE VISIT (OUTPATIENT)
Dept: HOME HEALTH SERVICES | Facility: HOME HEALTHCARE | Age: 64
End: 2024-04-19
Payer: COMMERCIAL

## 2024-04-22 ENCOUNTER — HOME CARE VISIT (OUTPATIENT)
Dept: HOME HEALTH SERVICES | Facility: HOME HEALTHCARE | Age: 64
End: 2024-04-22
Payer: COMMERCIAL

## 2024-04-22 VITALS
DIASTOLIC BLOOD PRESSURE: 82 MMHG | OXYGEN SATURATION: 98 % | RESPIRATION RATE: 20 BRPM | SYSTOLIC BLOOD PRESSURE: 132 MMHG | TEMPERATURE: 96.7 F | HEART RATE: 72 BPM

## 2024-04-22 PROCEDURE — G0299 HHS/HOSPICE OF RN EA 15 MIN: HCPCS

## 2024-04-23 ENCOUNTER — HOME CARE VISIT (OUTPATIENT)
Dept: HOME HEALTH SERVICES | Facility: HOME HEALTHCARE | Age: 64
End: 2024-04-23
Payer: COMMERCIAL

## 2024-04-23 PROCEDURE — 10330064 SPONGE, GAUZE 8PLY N/S 4"X4" (200/PK 20P

## 2024-04-23 NOTE — CASE COMMUNICATION
Ship to   Clinician   xx      Ordering Stefan NUÑEZ Dr [place]      Wound 1  L elbow     Full   XX        Frequency  Daily  Wound 2   R upper back     Full XX     Frequency  Daily  Wound 3   R side of abdomen - Full  XX   Frequency  Daily        Gauze 4x4 N/S 200 4x4s per unit  [id number] ...1

## 2024-04-25 ENCOUNTER — TELEPHONE (OUTPATIENT)
Dept: HOME HEALTH SERVICES | Facility: HOME HEALTHCARE | Age: 64
End: 2024-04-25

## 2024-04-27 ENCOUNTER — HOME CARE VISIT (OUTPATIENT)
Dept: HOME HEALTH SERVICES | Facility: HOME HEALTHCARE | Age: 64
End: 2024-04-27
Payer: COMMERCIAL

## 2024-04-27 PROCEDURE — G0299 HHS/HOSPICE OF RN EA 15 MIN: HCPCS

## 2024-04-28 ENCOUNTER — HOME CARE VISIT (OUTPATIENT)
Dept: HOME HEALTH SERVICES | Facility: HOME HEALTHCARE | Age: 64
End: 2024-04-28
Payer: COMMERCIAL

## 2024-04-28 NOTE — CASE COMMUNICATION
Ship to Clinician xx       Ordering Stefan NUÑEZ Dr [place]       Wound 1 L elbow Full XX Frequency   3x week  Wound 2 R upper back Full XX Frequency 3x week  Wound 3 R side of abdomen - Full XX Frequency Daily         Gauze 4x4 N/S 200 4x4s per unit [id number] ...1

## 2024-04-29 PROCEDURE — 10330064 SPONGE, GAUZE 8PLY N/S 4"X4" (200/PK 20P

## 2024-04-30 ENCOUNTER — HOME CARE VISIT (OUTPATIENT)
Dept: HOME HEALTH SERVICES | Facility: HOME HEALTHCARE | Age: 64
End: 2024-04-30
Payer: COMMERCIAL

## 2024-04-30 VITALS
DIASTOLIC BLOOD PRESSURE: 84 MMHG | OXYGEN SATURATION: 99 % | RESPIRATION RATE: 12 BRPM | TEMPERATURE: 95.3 F | SYSTOLIC BLOOD PRESSURE: 131 MMHG | HEART RATE: 68 BPM

## 2024-04-30 PROCEDURE — G0300 HHS/HOSPICE OF LPN EA 15 MIN: HCPCS

## 2024-05-01 VITALS
HEART RATE: 68 BPM | SYSTOLIC BLOOD PRESSURE: 138 MMHG | DIASTOLIC BLOOD PRESSURE: 96 MMHG | OXYGEN SATURATION: 97 % | RESPIRATION RATE: 18 BRPM | TEMPERATURE: 95.8 F

## 2024-05-03 ENCOUNTER — HOME CARE VISIT (OUTPATIENT)
Dept: HOME HEALTH SERVICES | Facility: HOME HEALTHCARE | Age: 64
End: 2024-05-03
Payer: COMMERCIAL

## 2024-05-06 ENCOUNTER — HOME CARE VISIT (OUTPATIENT)
Dept: HOME HEALTH SERVICES | Facility: HOME HEALTHCARE | Age: 64
End: 2024-05-06
Payer: COMMERCIAL

## 2024-05-06 VITALS
SYSTOLIC BLOOD PRESSURE: 133 MMHG | RESPIRATION RATE: 18 BRPM | OXYGEN SATURATION: 98 % | DIASTOLIC BLOOD PRESSURE: 87 MMHG | HEART RATE: 68 BPM | TEMPERATURE: 96.3 F

## 2024-05-06 PROCEDURE — G0299 HHS/HOSPICE OF RN EA 15 MIN: HCPCS

## 2024-05-07 ENCOUNTER — HOME CARE VISIT (OUTPATIENT)
Dept: HOME HEALTH SERVICES | Facility: HOME HEALTHCARE | Age: 64
End: 2024-05-07
Payer: COMMERCIAL

## 2024-05-07 NOTE — CASE COMMUNICATION
Ship to Clinician xx       Ordering Stefan NUÑEZ Dr [place]       Wound 1 L elbow Full XX Frequency 3x week   Wound 2 R upper back Full XX Frequency 3x week   Wound 3 R side of abdomen - Full XX Frequency Daily         Gauze 4x4 N/S 200 4x4s per unit [id number] ...1

## 2024-05-11 ENCOUNTER — HOME CARE VISIT (OUTPATIENT)
Dept: HOME HEALTH SERVICES | Facility: HOME HEALTHCARE | Age: 64
End: 2024-05-11
Payer: COMMERCIAL

## 2024-05-12 NOTE — CASE COMMUNICATION
Assigned clinician not available, patient declines replacement. FYI only, CMS regulations require us to notify you that there is a deviation from SN visit pattern for this week. Patient will be seen 1x  this week instead of projected  2x week visit pattern. Next visit is scheduled for Monday 5/13/24

## 2024-05-13 ENCOUNTER — HOME CARE VISIT (OUTPATIENT)
Dept: HOME HEALTH SERVICES | Facility: HOME HEALTHCARE | Age: 64
End: 2024-05-13
Payer: COMMERCIAL

## 2024-05-13 VITALS
SYSTOLIC BLOOD PRESSURE: 125 MMHG | DIASTOLIC BLOOD PRESSURE: 89 MMHG | HEART RATE: 72 BPM | TEMPERATURE: 97.5 F | OXYGEN SATURATION: 97 % | RESPIRATION RATE: 16 BRPM

## 2024-05-13 PROCEDURE — G0299 HHS/HOSPICE OF RN EA 15 MIN: HCPCS

## 2024-05-14 ENCOUNTER — HOME CARE VISIT (OUTPATIENT)
Dept: HOME HEALTH SERVICES | Facility: HOME HEALTHCARE | Age: 64
End: 2024-05-14
Payer: COMMERCIAL

## 2024-05-14 NOTE — CASE COMMUNICATION
Ship to Patient's Home       Ordering Stefan NUÑEZ Dr [place]       Wound 1 L elbow Full XX Frequency 3x week   Wound 2 R upper back Full XX Frequency 3x week   Wound 3 R side of abdomen - Full XX Frequency Daily       Gauze 4x4 N/S 200 4x4s per unit [id number] ...1      Foam Dressings  Sub for Allevyn:  Hydrocellular Foam Non-Adh. 4x4 [id number] ....30

## 2024-05-14 NOTE — CASE COMMUNICATION
Ship to Clinician xx       Ordering Stefan NUÑEZ Dr [place]       Wound 1 L elbow Full XX Frequency 3x week   Wound 2 R upper back Full XX Frequency 3x week   Wound 3 R side of abdomen - Full XX Frequency Daily     Gauze 4x4 N/S 200 4x4s per unit [id number] ...1

## 2024-05-17 ENCOUNTER — HOME CARE VISIT (OUTPATIENT)
Dept: HOME HEALTH SERVICES | Facility: HOME HEALTHCARE | Age: 64
End: 2024-05-17
Payer: COMMERCIAL

## 2024-05-20 ENCOUNTER — HOME CARE VISIT (OUTPATIENT)
Dept: HOME HEALTH SERVICES | Facility: HOME HEALTHCARE | Age: 64
End: 2024-05-20
Payer: COMMERCIAL

## 2024-05-20 VITALS
TEMPERATURE: 97.4 F | SYSTOLIC BLOOD PRESSURE: 110 MMHG | HEART RATE: 80 BPM | OXYGEN SATURATION: 100 % | DIASTOLIC BLOOD PRESSURE: 74 MMHG | RESPIRATION RATE: 18 BRPM

## 2024-05-20 PROCEDURE — G0299 HHS/HOSPICE OF RN EA 15 MIN: HCPCS

## 2024-05-21 ENCOUNTER — HOME CARE VISIT (OUTPATIENT)
Dept: HOME HEALTH SERVICES | Facility: HOME HEALTHCARE | Age: 64
End: 2024-05-21
Payer: COMMERCIAL

## 2024-05-21 NOTE — CASE COMMUNICATION
Ship to   Clinician XX      Ordering MD  Dr. Stefan Puckett supplies      Syringe 10cc [id number] .....1

## 2024-05-22 ENCOUNTER — HOME CARE VISIT (OUTPATIENT)
Dept: HOME HEALTH SERVICES | Facility: HOME HEALTHCARE | Age: 64
End: 2024-05-22
Payer: COMMERCIAL

## 2024-05-22 NOTE — CASE COMMUNICATION
Ship to    Pt. Home       Ordering MD Dr. Stefan Roberts    Wound 1   L elbow . R side of abdomen,     Full    XX    Frequency 3x week  Wound 2   R uppeer back  Full XX        Frequency   daily  Wound  3   Nephrostomy tube      Gauze 4x4 ST [id number] ...100    Gauze Devin stretch roll 4inch n/s 12 rolls per unit  [id number] ....2    Drain sponges 4x4 split [id number] ......20      Tape  Tape. Mefix 2inc [id number] ....2 boxes

## 2024-05-24 ENCOUNTER — HOME CARE VISIT (OUTPATIENT)
Dept: HOME HEALTH SERVICES | Facility: HOME HEALTHCARE | Age: 64
End: 2024-05-24
Payer: COMMERCIAL

## 2024-05-24 VITALS
DIASTOLIC BLOOD PRESSURE: 93 MMHG | OXYGEN SATURATION: 98 % | TEMPERATURE: 95.4 F | SYSTOLIC BLOOD PRESSURE: 121 MMHG | HEART RATE: 64 BPM | RESPIRATION RATE: 18 BRPM

## 2024-05-24 PROCEDURE — G0299 HHS/HOSPICE OF RN EA 15 MIN: HCPCS

## 2024-05-28 ENCOUNTER — HOME CARE VISIT (OUTPATIENT)
Dept: HOME HEALTH SERVICES | Facility: HOME HEALTHCARE | Age: 64
End: 2024-05-28
Payer: COMMERCIAL

## 2024-05-28 ENCOUNTER — HOSPITAL ENCOUNTER (OUTPATIENT)
Dept: RADIOLOGY | Facility: HOSPITAL | Age: 64
Discharge: HOME | End: 2024-05-28
Attending: STUDENT IN AN ORGANIZED HEALTH CARE EDUCATION/TRAINING PROGRAM | Admitting: RADIOLOGY
Payer: COMMERCIAL

## 2024-05-28 DIAGNOSIS — N20.0 STAGHORN CALCULUS: Primary | ICD-10-CM

## 2024-05-28 DIAGNOSIS — Z93.59 SUPRAPUBIC CATHETER (HCC): ICD-10-CM

## 2024-05-28 DIAGNOSIS — G82.50 QUADRIPLEGIA (HCC): ICD-10-CM

## 2024-05-28 PROCEDURE — C1729 CATH, DRAINAGE: HCPCS

## 2024-05-28 PROCEDURE — 50435 EXCHANGE NEPHROSTOMY CATH: CPT | Performed by: RADIOLOGY

## 2024-05-28 PROCEDURE — C1769 GUIDE WIRE: HCPCS

## 2024-05-28 PROCEDURE — 50435 EXCHANGE NEPHROSTOMY CATH: CPT

## 2024-05-28 RX ADMIN — IOHEXOL 10 ML: 350 INJECTION, SOLUTION INTRAVENOUS at 10:36

## 2024-05-28 NOTE — CASE COMMUNICATION
Ship to Patient's Home     Ordering Stefan NUÑEZ Dr [place]     Wound 1 L elbow Full XX Frequency 3x week   Wound 2 R upper back Full XX Frequency 3x week   Wound 3 R side of abdomen - Full XX Frequency Daily     Foam Dressings   Sub for Allevyn:   Hydrocellular Foam Non-Adh. 4x4 [id number] ....30

## 2024-05-28 NOTE — BRIEF OP NOTE (RAD/CATH)
INTERVENTIONAL RADIOLOGY PROCEDURE NOTE    Date: 5/28/2024    Procedure:   Procedure Summary       Date: 05/28/24 Room / Location: [facility] Interventional Radiology    Anesthesia Start:  Anesthesia Stop:     Procedure: IR NEPHROSTOMY TUBE CHECK/CHANGE/REPOSITION/REINSERTION/UPSIZE Diagnosis:       Quadriplegia (HCC)      (routine exchange R PCN)    Scheduled Providers:  Responsible Provider:     Anesthesia Type: Not recorded ASA Status: Not recorded            Preoperative diagnosis:   1. Quadriplegia (HCC)         Postoperative diagnosis: Same.    Surgeon: Matty Mcdaniel MD     Assistant: None. No qualified resident was available.    Blood loss: none    Specimens: none     Findings: Existing 10.2 Fr x 25 cm right PCN exchanged for a 10.2 Fr x 45 cm PCN    Complications: None immediate.    Anesthesia: none

## 2024-05-31 ENCOUNTER — HOME CARE VISIT (OUTPATIENT)
Dept: HOME HEALTH SERVICES | Facility: HOME HEALTHCARE | Age: 64
End: 2024-05-31
Payer: COMMERCIAL

## 2024-05-31 VITALS
RESPIRATION RATE: 20 BRPM | OXYGEN SATURATION: 97 % | TEMPERATURE: 97.1 F | HEART RATE: 70 BPM | SYSTOLIC BLOOD PRESSURE: 136 MMHG | DIASTOLIC BLOOD PRESSURE: 92 MMHG

## 2024-05-31 PROCEDURE — 400014 VN F/U

## 2024-05-31 PROCEDURE — G0299 HHS/HOSPICE OF RN EA 15 MIN: HCPCS

## 2024-06-03 ENCOUNTER — HOME CARE VISIT (OUTPATIENT)
Dept: HOME HEALTH SERVICES | Facility: HOME HEALTHCARE | Age: 64
End: 2024-06-03
Payer: COMMERCIAL

## 2024-06-03 VITALS
RESPIRATION RATE: 16 BRPM | OXYGEN SATURATION: 100 % | TEMPERATURE: 95.5 F | DIASTOLIC BLOOD PRESSURE: 89 MMHG | HEART RATE: 80 BPM | SYSTOLIC BLOOD PRESSURE: 130 MMHG

## 2024-06-03 PROCEDURE — G0299 HHS/HOSPICE OF RN EA 15 MIN: HCPCS

## 2024-06-04 ENCOUNTER — HOME CARE VISIT (OUTPATIENT)
Dept: HOME HEALTH SERVICES | Facility: HOME HEALTHCARE | Age: 64
End: 2024-06-04
Payer: COMMERCIAL

## 2024-06-04 NOTE — CASE COMMUNICATION
Hello, I saw patient yesterday and he has a new open area, unsure of cause, on his L elbow lateral to original wound -  not sure if it is a pressure ulcer or patient feels he may have pinched skin when he was out in his wheelchair over the weekend. I will be adding a new wound to his care plan for this new wound and will be performing same wound care as we have been doing to the other area which is cleansing with wound cleanser, applyin g zinc oxide to periwound, applying foam dressing and wrapping with rené and changing 3x week if that is ok with you. Also I wanted to change frequency of his dressing change to his R side of his abdomen to daily at this time but change to 3x week according to his drainage. Also most of the slough on his R upper back wound has come off and our wound nurse recommended once slough has been removed to apply silver alginate and foam dressi ng to area and apply zinc oxide to periwound and continue to change daily at this time. If these changes are ok with you I will send you a verbal order for these changes. Thank you.

## 2024-06-04 NOTE — Clinical Note
Thank you.  ----- Message -----  From: Stefan Roberts MD  Sent: 6/4/2024  10:11 AM EDT  To: Shelley Delvalle RN      I trust and appreciate your judgement.  I am also very grateful for your care of C[place]. Despite his wife's considerable efforts, it very much seems like professional care is very necessary given his complexity.  I will keep an eye out for further orders.     Stefan  ----- Message -----  From: Shelley Delvalle RN  Sent: 6/4/2024   9:50 AM EDT  To: Stefan Roberts MD    [facility], I saw patient yesterday and he has a new open area, unsure of cause, on his L elbow lateral to original wound -  not sure if it is a pressure ulcer or patient feels he may have pinched skin when he was out in his wheelchair over the weekend. I will be adding a new wound to his care plan for this new wound and will be performing same wound care as we have been doing to the other area which is cleansing with wound cleanser, applying zinc oxide to periwound, applying foam dressing and wrapping with rené and changing 3x week if that is ok with you. Also I wanted to change frequency of his dressing change to his R side of his abdomen to daily at this time but change to 3x week according to his drainage. Also most of the slough on his R upper back wound has come off and our wound nurse recommended once slough has been removed to apply silver alginate and foam dressing to area and apply zinc oxide to periwound and continue to change daily at this time. If these changes are ok with you I will send you a verbal order for these changes. Thank you.

## 2024-06-05 NOTE — CASE COMMUNICATION
Ship to  Clinician  XX    Ordering Stefan NUÑEZ Dr [place]       Wound 1 Upper back    Full XX     Frequency Daily      Alginate with Silver [id number] .....2

## 2024-06-06 ENCOUNTER — HOME CARE VISIT (OUTPATIENT)
Dept: HOME HEALTH SERVICES | Facility: HOME HEALTHCARE | Age: 64
End: 2024-06-06
Payer: COMMERCIAL

## 2024-06-06 NOTE — CASE COMMUNICATION
Ship to [facility]    XX     Ordering MD James,Stefan BLEDSOE Inter[place]e       Wound 1 Upper back Full XX Frequency Daily       Alginate with Silver [id number] ....1   Detail Level: Detailed Detail Level: Generalized

## 2024-06-10 ENCOUNTER — HOME CARE VISIT (OUTPATIENT)
Dept: HOME HEALTH SERVICES | Facility: HOME HEALTHCARE | Age: 64
End: 2024-06-10
Payer: COMMERCIAL

## 2024-06-10 VITALS
SYSTOLIC BLOOD PRESSURE: 118 MMHG | RESPIRATION RATE: 20 BRPM | HEART RATE: 64 BPM | DIASTOLIC BLOOD PRESSURE: 65 MMHG | TEMPERATURE: 96.4 F | OXYGEN SATURATION: 98 %

## 2024-06-10 PROCEDURE — G0299 HHS/HOSPICE OF RN EA 15 MIN: HCPCS

## 2024-06-10 NOTE — Clinical Note
Thanks  ----- Message -----  From: Asya Winters LPN  Sent: 6/10/2024   4:13 PM EDT  To: Shelley Delvalle RN; /[facility]      Rukhsana Rosa,    The order from 5/28 never came to P /[facility] which is why it didn't arrive. It was sent to P /[facility] interim orders or something similar to that. I was able to locate the order and put it through.     Thank you,  Asya  ----- Message -----  From: Shelley Delvalle RN  Sent: 6/10/2024   3:50 PM EDT  To: [facility]    Can you please check on the supply order from 5/28/24 -  patient has not received yet. Thanks

## 2024-06-12 ENCOUNTER — HOME CARE VISIT (OUTPATIENT)
Dept: HOME HEALTH SERVICES | Facility: HOME HEALTHCARE | Age: 64
End: 2024-06-12
Payer: COMMERCIAL

## 2024-06-12 NOTE — CASE COMMUNICATION
Ship to Patient's home    Ordering MD James,Stefan BLEDSOE Interrante     Wound 1 Upper back Full XX Frequency Daily     Alginate with Silver [id number] ....4

## 2024-06-17 ENCOUNTER — HOME CARE VISIT (OUTPATIENT)
Dept: HOME HEALTH SERVICES | Facility: HOME HEALTHCARE | Age: 64
End: 2024-06-17
Payer: COMMERCIAL

## 2024-06-17 PROCEDURE — G0299 HHS/HOSPICE OF RN EA 15 MIN: HCPCS

## 2024-06-18 ENCOUNTER — TELEPHONE (OUTPATIENT)
Dept: OTHER | Facility: OTHER | Age: 64
End: 2024-06-18

## 2024-06-18 VITALS
TEMPERATURE: 95.4 F | DIASTOLIC BLOOD PRESSURE: 82 MMHG | OXYGEN SATURATION: 98 % | HEART RATE: 76 BPM | RESPIRATION RATE: 22 BRPM | SYSTOLIC BLOOD PRESSURE: 112 MMHG

## 2024-06-18 NOTE — TELEPHONE ENCOUNTER
Patient is calling regarding cancelling an appointment.    Date/Time: June 18, 2024 at 2:20 pm    Patient was rescheduled: YES [] NO [x]    Patient requesting call back to reschedule: YES [x] NO []      Patient's wife would liked to be called at   [phone].

## 2024-06-25 ENCOUNTER — HOME CARE VISIT (OUTPATIENT)
Dept: HOME HEALTH SERVICES | Facility: HOME HEALTHCARE | Age: 64
End: 2024-06-25
Payer: COMMERCIAL

## 2024-06-27 NOTE — CASE COMMUNICATION
Wife refused visit for today since patient was not feeling well and declined another visit for this week.   Deviation from SN visit pattern for this week. Patient will not be seen this week instead of projected visit pattern of 1x week. Next visit is scheduled for Monday 7/1/24. Wife agreeable to call [facility] with questions/concerns

## 2024-07-01 ENCOUNTER — HOME CARE VISIT (OUTPATIENT)
Dept: HOME HEALTH SERVICES | Facility: HOME HEALTHCARE | Age: 64
End: 2024-07-01
Payer: COMMERCIAL

## 2024-07-01 VITALS
WEIGHT: 147.4 LBS | RESPIRATION RATE: 18 BRPM | TEMPERATURE: 96.3 F | OXYGEN SATURATION: 98 % | HEART RATE: 68 BPM | DIASTOLIC BLOOD PRESSURE: 83 MMHG | SYSTOLIC BLOOD PRESSURE: 106 MMHG

## 2024-07-01 PROCEDURE — G0299 HHS/HOSPICE OF RN EA 15 MIN: HCPCS

## 2024-07-02 ENCOUNTER — HOME CARE VISIT (OUTPATIENT)
Dept: HOME HEALTH SERVICES | Facility: HOME HEALTHCARE | Age: 64
End: 2024-07-02
Payer: COMMERCIAL

## 2024-07-02 NOTE — CASE COMMUNICATION
Ship to Pt. Home     Ordering MD Dr. Stefan Roberts     Wound 1 L elbow . R side of abdomen, Full XX Frequency 3x week   Wound 2 R uppeer back Full XX Frequency daily  Wound 3 L elbow   Full  XX   3x week      Gauze 4x4 ST [id number] ...100     Gauze Devin stretch roll 4inch n/s 12 rolls per unit 980912 ....2       Tape   Tape. Mefix 2inch [id number] ....2 boxes       Foam Dressings   Sub for Allevyn:   Hydrocellular Foam Non-Adh. 4x4 [id number]  ....20

## 2024-07-04 ENCOUNTER — HOME CARE VISIT (OUTPATIENT)
Dept: HOME HEALTH SERVICES | Facility: HOME HEALTHCARE | Age: 64
End: 2024-07-04
Payer: COMMERCIAL

## 2024-07-05 NOTE — CASE COMMUNICATION
Hi, Patient's wife did not like using calcium alginate with silver to wound on R upper back. She wanted to resume just using foam dressing over his R upper back wound. I will be sending you an order for this change in wound care. Please contact me with any concerns with this change. Thank you.

## 2024-07-08 ENCOUNTER — TELEPHONE (OUTPATIENT)
Dept: HOME HEALTH SERVICES | Facility: HOME HEALTHCARE | Age: 64
End: 2024-07-08

## 2024-07-08 ENCOUNTER — HOME CARE VISIT (OUTPATIENT)
Dept: HOME HEALTH SERVICES | Facility: HOME HEALTHCARE | Age: 64
End: 2024-07-08
Payer: COMMERCIAL

## 2024-07-08 PROCEDURE — G0299 HHS/HOSPICE OF RN EA 15 MIN: HCPCS

## 2024-07-15 ENCOUNTER — HOME CARE VISIT (OUTPATIENT)
Dept: HOME HEALTH SERVICES | Facility: HOME HEALTHCARE | Age: 64
End: 2024-07-15
Payer: COMMERCIAL

## 2024-07-15 VITALS
DIASTOLIC BLOOD PRESSURE: 85 MMHG | HEART RATE: 77 BPM | OXYGEN SATURATION: 98 % | RESPIRATION RATE: 16 BRPM | SYSTOLIC BLOOD PRESSURE: 117 MMHG | TEMPERATURE: 98.1 F

## 2024-07-15 PROCEDURE — G0299 HHS/HOSPICE OF RN EA 15 MIN: HCPCS

## 2024-07-16 ENCOUNTER — HOME CARE VISIT (OUTPATIENT)
Dept: HOME HEALTH SERVICES | Facility: HOME HEALTHCARE | Age: 64
End: 2024-07-16
Payer: COMMERCIAL

## 2024-07-16 VITALS
SYSTOLIC BLOOD PRESSURE: 122 MMHG | OXYGEN SATURATION: 99 % | TEMPERATURE: 97.4 F | RESPIRATION RATE: 18 BRPM | DIASTOLIC BLOOD PRESSURE: 87 MMHG | HEART RATE: 89 BPM

## 2024-07-17 ENCOUNTER — OFFICE VISIT (OUTPATIENT)
Dept: PALLIATIVE MEDICINE | Facility: CLINIC | Age: 64
End: 2024-07-17
Payer: COMMERCIAL

## 2024-07-17 VITALS
OXYGEN SATURATION: 100 % | SYSTOLIC BLOOD PRESSURE: 78 MMHG | TEMPERATURE: 97.5 F | HEART RATE: 77 BPM | DIASTOLIC BLOOD PRESSURE: 52 MMHG

## 2024-07-17 DIAGNOSIS — M79.18 MYOFASCIAL MUSCLE PAIN: ICD-10-CM

## 2024-07-17 DIAGNOSIS — R06.02 SHORTNESS OF BREATH: ICD-10-CM

## 2024-07-17 DIAGNOSIS — I51.9 HEART DISEASE: ICD-10-CM

## 2024-07-17 DIAGNOSIS — L98.9 NON-HEALING SKIN LESION: ICD-10-CM

## 2024-07-17 DIAGNOSIS — I35.0 SEVERE AORTIC STENOSIS: ICD-10-CM

## 2024-07-17 DIAGNOSIS — Z51.5 PALLIATIVE CARE BY SPECIALIST: Primary | ICD-10-CM

## 2024-07-17 DIAGNOSIS — R06.00 DYSPNEA, UNSPECIFIED TYPE: ICD-10-CM

## 2024-07-17 DIAGNOSIS — L89.213 PRESSURE INJURY OF RIGHT HIP, STAGE 3 (HCC): ICD-10-CM

## 2024-07-17 DIAGNOSIS — G82.50 QUADRIPLEGIA (HCC): ICD-10-CM

## 2024-07-17 PROCEDURE — 99215 OFFICE O/P EST HI 40 MIN: CPT | Performed by: INTERNAL MEDICINE

## 2024-07-17 RX ORDER — SULFAMETHOXAZOLE AND TRIMETHOPRIM 800; 160 MG/1; MG/1
1 TABLET ORAL EVERY 12 HOURS SCHEDULED
Qty: 20 TABLET | Refills: 0 | Status: SHIPPED | OUTPATIENT
Start: 2024-07-17 | End: 2024-07-27

## 2024-07-17 RX ORDER — MORPHINE SULFATE 100 MG/5ML
SOLUTION, CONCENTRATE ORAL
Qty: 30 ML | Refills: 0 | Status: SHIPPED | OUTPATIENT
Start: 2024-07-17

## 2024-07-17 NOTE — CASE COMMUNICATION
Hello, I had a home visit w/ your pt yesterday. He continues to have a surgical wound on his chest where a  cancerous lesion was removed. Right now his caregiver is leaving the area RAKESH. Please advise if you would prefer the caregiver to use another treatment on this area. Thank you.

## 2024-07-17 NOTE — CASE COMMUNICATION
Ship to x Clinician   Branch: Luis Aviles [place]    Wound 1 R back    Partial x  Frequency  Wound 2 R abd Partial x   Frequency       Gauze 4x4 N/S 200 4x4s per unit  [id number] 1

## 2024-07-17 NOTE — ASSESSMENT & PLAN NOTE
- advised to reach out directly to dermatology for expedited appt.  Prescribed bactrim.  Discussed warning signs of worsening infection.

## 2024-07-17 NOTE — PROGRESS NOTES
Ambulatory Visit  Name: Herminio Roth      : 1960      MRN: [id number]  Encounter Provider: Stefan Roberts MD  Encounter Date: 2024   Encounter department: [facility] PALLIATIVE CARE [place]    Assessment & Plan   1. Palliative care by specialist  2. Myofascial muscle pain  3. Shortness of breath  4. Severe aortic stenosis  5. Quadriplegia (HCC)  6. Pressure injury of right hip, stage 3 (HCC)  7. Heart disease  -     Morphine Sulfate, Concentrate, 20 mg/mL concentrated solution; Take 10mg PO as needed every 1 hour for breathlessness, or 15mg PO as needed every 2 hours for pain  8. Dyspnea, unspecified type  -     Morphine Sulfate, Concentrate, 20 mg/mL concentrated solution; Take 10mg PO as needed every 1 hour for breathlessness, or 15mg PO as needed every 2 hours for pain  9. Non-healing skin lesion  Assessment & Plan:  - advised to reach out directly to dermatology for expedited appt.  Prescribed bactrim.  Discussed warning signs of worsening infection.   Orders:  -     sulfamethoxazole-trimethoprim (BACTRIM DS) 800-160 mg per tablet; Take 1 tablet by mouth every 12 (twelve) hours for 10 days          PDMP Review: I have reviewed the patient's controlled substance dispensing history in the Prescription Drug Monitoring Program in compliance with the [facility] regulations before prescribing any controlled substances.    History of Present Illness     Herminio Roth is a 64 y.o. male well known to me with severe AS, quadriplegia, and chronic pain who presents in follow up.  Pain has been well controlled on occasional roxanol.  This medication has also served to improve his dyspnea at a lower dose.  He has still had spells of breathlessness, but no more frequently than several months ago.  Overall, he feels the same or better since our last visit.  He has had excellent ongoing wound care via [facility].  A concern was brought to me about a wound on his chest at the site of a removed lesion from  "about a year ago through an outside dermatology office.  Denies fever/chills or other systemic symptoms.            Objective     BP (!) 78/52 (BP Location: Right arm, Patient Position: Sitting, Cuff Size: Standard)   Pulse 77   Temp 97.5 °F (36.4 °C) (Temporal)   SpO2 100%     Physical Exam  Vitals and nursing note reviewed.   Constitutional:       General: He is not in acute distress.     Appearance: He is ill-appearing. He is not toxic-appearing or diaphoretic.   HENT:      Head: Normocephalic and atraumatic.      Right Ear: External ear normal.      Left Ear: External ear normal.      Nose: Nose normal.      Mouth/Throat:      Mouth: Mucous membranes are moist.   Eyes:      General:         Right eye: No discharge.         Left eye: No discharge.   Cardiovascular:      Rate and Rhythm: Normal rate.   Pulmonary:      Effort: No respiratory distress.   Musculoskeletal:         General: Deformity (contractures and muscle wasting evident) present.   Skin:     Comments: 2-3 cm lesion at mid-sternum with mild surrounding erythema and scant purulent discharge.  Surrounding tissue not tender or warm to touch   Neurological:      General: No focal deficit present.      Mental Status: He is alert and oriented to person, place, and time.   Psychiatric:         Mood and Affect: Mood normal.         Behavior: Behavior normal.         Recent labs:  Lab Results   Component Value Date/Time    SODIUM 136 02/27/2024 08:53 AM    K 4.9 02/27/2024 08:53 AM    BUN 31 (H) 02/27/2024 08:53 AM    CREATININE 0.36 (L) 02/27/2024 08:53 AM    GLUC 114 (H) 02/27/2024 08:53 AM    CALCIUM 9.0 02/27/2024 08:53 AM    AST 10 02/27/2024 08:53 AM    ALT 9 02/27/2024 08:53 AM    ALB 3.8 02/27/2024 08:53 AM    TP 7.0 02/27/2024 08:53 AM    EGFR 127 02/27/2024 08:53 AM    EGFR 132 10/23/2020 03:55 PM     Lab Results   Component Value Date/Time    INR 1.0 09/22/2023 09:15 AM     No results found for: \"[id number]\"    Recent Imaging:  Procedure: IR " nephrostomy tube check/change/reposition/reinsertion/upsize    Result Date: 5/28/2024  Narrative: IR NEPHROSTOMY TUBE CHECK/CHANGE/REPOSITION/REINSERTION/UPSIZE Clinical History: G82.50: Quadriplegia, unspecified Fluoroscopy time: 0.8 min. ( 25 mGy ) Contrast: 10 mL of iohexol (OMNIPAQUE) Sedation time: n/a Procedure: The patient was postpone on the fluoroscopy table and the indwelling left nephrostomy catheter prepped and draped in the usual sterile fashion. A  view demonstrates a 10 Norwegian nephrostomy catheter. Contrast injection confirmed proper positioning. The catheter was transected and removed over a heavy wire. A new 10 Norwegian by 45 cm nephrostomy was inserted. The pigtail was formed within the pelvis and contrast injection confirmed proper positioning. The catheter was secured in place with 0 Prolene suture and placed to gravity drainage. The patient tolerated the procedure well and left the department in stable condition.     Impression: Impression: Conversion of the 10 Norwegian by 25 cm nephrostomy to a 10 Norwegian by 45 cm nephrostomy. Workstation performed: [id number]     Procedure: IR NEPHROSTOMY TUBE CHANGE RIGHT    Result Date: 5/10/2024  Narrative: This result has an attachment that is not available. HISTORY: Leaking right nephrostomy catheter. PROCEDURE: Estimated Blood Loss (mL): <5 Fluoroscopy time (minutes): 0.3 CAK Dose (mGy): 2.91 Contrast volume (mL): 5CC Contrast type: OMNI 300 Contrast route: RIGHT KIDNEY Informed consent for the procedure was obtained from the patient. A time-out was performed with all team members present and in agreement, confirming the correct patient, correct procedure, and correct side/site. The skin was prepped using ChloraPrep, and allowed to dry before sterile draping applied in the usual sterile fashion. Maximum sterile barrier technique was used, including the use of a cap, mask, sterile gown, gloves, and full body drape, after recommended hand hygiene and  aseptic techniques. Right nephrostogram demonstrated patency, catheter positioned within the right renal pelvis. Over-the-wire exchange was made for a new 10.2 Slovenian, 25 cm MPC catheter, distal loop formed within the right renal pelvis. Contrast injected demonstrating patency in appropriate position, image documentation obtained. Catheter sutured in place and connected to bag drainage. Patient left department in stable condition.    Impression: IMPRESSION: Right nephrostomy catheter exchange. Workstation:[id number]    Procedure: IR NEPHROSTOMY TUBE CHANGE RIGHT    Result Date: 5/9/2024  Narrative: This result has an attachment that is not available. Procedure: Image guided right nephrostomy tube exchange. Clinical History: Chronic right nephrostomy tube drainage. Routine exchange. Antibiotics/medications given: N/A Estimated Blood Loss (mL): less than 5 Fluoroscopy time (minutes): 0.4 CAK Dose (mGy): 4.0 Contrast volume (mL): 6 Contrast type: Omnipaque 300 Contrast route: Nephrostomy tubes. Informed consent for the procedure was obtained. A time-out was performed with all team members present and in agreement, confirming the correct patient, correct procedure, and correct side/site. Technique: The patient was placed prone and the right flank and indwelling right nephrostomy tube was prepped and draped, using ChloraPrep, which was allowed to dry before sterile draping applied in the usual sterile fashion. Maximum sterile barrier technique was used, including the use of a cap, mask, sterile gown, gloves, and full body drape, after recommended hand hygiene and aseptic techniques. A small amount of contrast was injected through the nephrostomy tube and a limited nephrostogram was performed. The catheter was exchanged over a wire for a similar 10.2 Slovenian nephrostomy catheter positioned within the renal pelvis. The catheter was gently flushed with sterile saline, secured to the skin with nonabsorbable suture, returned to  external bag drainage and a sterile dressing applied. The patient tolerated the procedure well. Plan: Patient will return in 8-12 weeks for routine nephrostomy tube exchange.    Impression: Impression: Image guided routine right nephrostomy catheter exchange. Workstation:[id number]       Administrative Statements            no

## 2024-07-22 ENCOUNTER — HOME CARE VISIT (OUTPATIENT)
Dept: HOME HEALTH SERVICES | Facility: HOME HEALTHCARE | Age: 64
End: 2024-07-22
Payer: COMMERCIAL

## 2024-07-22 PROCEDURE — G0299 HHS/HOSPICE OF RN EA 15 MIN: HCPCS

## 2024-07-25 VITALS
OXYGEN SATURATION: 96 % | HEART RATE: 76 BPM | RESPIRATION RATE: 16 BRPM | DIASTOLIC BLOOD PRESSURE: 79 MMHG | SYSTOLIC BLOOD PRESSURE: 107 MMHG | TEMPERATURE: 97.6 F

## 2024-07-26 ENCOUNTER — HOSPITAL ENCOUNTER (OUTPATIENT)
Dept: RADIOLOGY | Facility: HOSPITAL | Age: 64
Discharge: HOME | End: 2024-07-26
Attending: RADIOLOGY | Admitting: RADIOLOGY
Payer: COMMERCIAL

## 2024-07-26 DIAGNOSIS — N20.0 STAGHORN CALCULUS: ICD-10-CM

## 2024-07-26 DIAGNOSIS — N13.2 HYDRONEPHROSIS WITH OBSTRUCTING CALCULUS: Primary | ICD-10-CM

## 2024-07-26 PROCEDURE — 50435 EXCHANGE NEPHROSTOMY CATH: CPT | Performed by: RADIOLOGY

## 2024-07-26 PROCEDURE — 50435 EXCHANGE NEPHROSTOMY CATH: CPT

## 2024-07-26 PROCEDURE — C1729 CATH, DRAINAGE: HCPCS

## 2024-07-26 PROCEDURE — C1769 GUIDE WIRE: HCPCS

## 2024-07-26 RX ADMIN — IOHEXOL 16 ML: 350 INJECTION, SOLUTION INTRAVENOUS at 09:50

## 2024-07-26 NOTE — DISCHARGE INSTRUCTIONS
"                                                                      TUBE CARE INSTRUCTIONS    Care after your procedure:    Resume your normal diet. Small sips of flat soda will help with nausea.    1. The properly functioning catheter should be forward flushed once (1x) daily with 10ml of normal saline using clean technique. You will be given a prescription for flushes.   To flush the tube, clean both connections with alcohol swab.Twist off the drainage bag/ bulb  tubing and twist the saline syringe into the drainage tube and flush. Remove the syringe and twist the drainage bag / bulb tubing tubing back on.    2. The drainage bag/bulb may be emptied as necessary. Keep a record of the amount of fluid you drain from your tube. This should be done with clean technique as well.     3. A fresh dressing should be applied daily over the tube insertion site.     4. As the tube is secured to the skin with only a suture,try not to pull on your tube. Tub baths are not permitted. Showers are permitted if the patient's skin entry site is prevented from getting wet. Similarly, washcloth \"baths\" are acceptable.     Contact Interventional Radiology at [phone] if:    1. Leakage or large amounts of liquid around the catheter.    2. Fever of 101 degrees lasting several hours without other obvious cause (such as sore throat, flu, etc).    3. Persistent nausea or vomiting.    4. Diminished drainage, which may be associated with pressure or pain. Or when the     drainage from your tube is less than 10mls for 48 hours.    5. Catheter pulled back or falls out.      The following pharmacies carry the flush syringes.       [facility]                     [place] [facility] [address]                         [phone]  [place]                       [place]  Phone [phone]            Phone [phone]                         " "                                                                              Hubert Yu   [facility]             [facility]                                [phone]  [address]                      [address] [facility]  [facility]  Phone [phone]            Phone [phone]                      [facility]                                                                                                          [phone]  [facility]  [address]  [facility]  Phone [phone]                                                                      TUBE CARE INSTRUCTIONS    Care after your procedure:    Resume your normal diet. Small sips of flat soda will help with nausea.    1. The properly functioning catheter should be forward flushed once (1x) daily with 10ml of normal saline using clean technique. You will be given a prescription for flushes.   To flush the tube, clean both connections with alcohol swab.Twist off the drainage bag/ bulb  tubing and twist the saline syringe into the drainage tube and flush. Remove the syringe and twist the drainage bag / bulb tubing tubing back on.    2. The drainage bag/bulb may be emptied as necessary. Keep a record of the amount of fluid you drain from your tube. This should be done with clean technique as well.     3. A fresh dressing should be applied daily over the tube insertion site.     4. As the tube is secured to the skin with only a suture,try not to pull on your tube. Tub baths are not permitted. Showers are permitted if the patient's skin entry site is prevented from getting wet. Similarly, washcloth \"baths\" are acceptable.     Contact Interventional Radiology at [phone] if:    1. Leakage or large amounts of liquid around " the catheter.    2. Fever of 101 degrees lasting several hours without other obvious cause (such as sore throat, flu, etc).    3. Persistent nausea or vomiting.    4. Diminished drainage, which may be associated with pressure or pain. Or when the     drainage from your tube is less than 10mls for 48 hours.    5. Catheter pulled back or falls out.      The following pharmacies carry the flush syringes.       [facility]                     [place] [facility]                             [facility]  [address]                     [address] [facility]                         [phone]  [place]                       [place]  Phone [phone]            Phone [phone]                                                                                                       [facility]             [facility]                                [phone]  [address]                      [address] [facility]  [facility] [place]  Phone [phone]            Phone [phone]                      [facility]                                                                                                          [phone]  [facility]  [address]  Lu[place] COBB                                                                               [facility]  Phone [phone]

## 2024-07-26 NOTE — BRIEF OP NOTE (RAD/CATH)
INTERVENTIONAL RADIOLOGY PROCEDURE NOTE    Date: 7/26/2024    Procedure:   Procedure Summary       Date: 07/26/24 Room / Location: [facility] Interventional Radiology    Anesthesia Start:  Anesthesia Stop:     Procedure: IR NEPHROSTOMY TUBE CHECK/CHANGE/REPOSITION/REINSERTION/UPSIZE Diagnosis:       Staghorn calculus      (routine)    Scheduled Providers:  Responsible Provider:     Anesthesia Type: Not recorded ASA Status: Not recorded            Preoperative diagnosis:   1. Hydronephrosis with obstructing calculus    2. Staghorn calculus         Postoperative diagnosis: Same.    Surgeon: Matty Mcdaniel MD     Assistant: None. No qualified resident was available.    Blood loss: none    Specimens: none     Findings: 10 fr x 45 cm PCN exchanged for 10 Fr x 60 cm catheter.    Complications: None immediate.    Anesthesia: none

## 2024-07-29 ENCOUNTER — HOME CARE VISIT (OUTPATIENT)
Dept: HOME HEALTH SERVICES | Facility: HOME HEALTHCARE | Age: 64
End: 2024-07-29
Payer: COMMERCIAL

## 2024-07-29 VITALS
TEMPERATURE: 96.6 F | RESPIRATION RATE: 20 BRPM | HEART RATE: 68 BPM | DIASTOLIC BLOOD PRESSURE: 95 MMHG | OXYGEN SATURATION: 99 % | SYSTOLIC BLOOD PRESSURE: 143 MMHG

## 2024-07-29 PROCEDURE — 400014 VN F/U

## 2024-07-29 PROCEDURE — G0299 HHS/HOSPICE OF RN EA 15 MIN: HCPCS

## 2024-08-05 ENCOUNTER — HOME CARE VISIT (OUTPATIENT)
Dept: HOME HEALTH SERVICES | Facility: HOME HEALTHCARE | Age: 64
End: 2024-08-05
Payer: COMMERCIAL

## 2024-08-05 PROCEDURE — G0299 HHS/HOSPICE OF RN EA 15 MIN: HCPCS

## 2024-08-10 VITALS
RESPIRATION RATE: 22 BRPM | DIASTOLIC BLOOD PRESSURE: 94 MMHG | HEART RATE: 82 BPM | TEMPERATURE: 98.7 F | SYSTOLIC BLOOD PRESSURE: 135 MMHG

## 2024-08-12 ENCOUNTER — HOME CARE VISIT (OUTPATIENT)
Dept: HOME HEALTH SERVICES | Facility: HOME HEALTHCARE | Age: 64
End: 2024-08-12
Payer: COMMERCIAL

## 2024-08-12 PROCEDURE — G0299 HHS/HOSPICE OF RN EA 15 MIN: HCPCS

## 2024-08-14 ENCOUNTER — HOME CARE VISIT (OUTPATIENT)
Dept: HOME HEALTH SERVICES | Facility: HOME HEALTHCARE | Age: 64
End: 2024-08-14
Payer: COMMERCIAL

## 2024-08-14 VITALS
DIASTOLIC BLOOD PRESSURE: 98 MMHG | OXYGEN SATURATION: 98 % | SYSTOLIC BLOOD PRESSURE: 137 MMHG | TEMPERATURE: 97.8 F | HEART RATE: 76 BPM | RESPIRATION RATE: 18 BRPM

## 2024-08-14 NOTE — CASE COMMUNICATION
Ship to Pt. Home     Ordering MD Dr. Aviles Interrante     Wound 1 L elbow -  R side of abdomen, - Full  XX    Frequency 3x week   Wound 2   R uppeer back   Full   XX    Frequency daily       Tape   Tape. Mefix 2inch [id number] ....2 boxes     Foam Dressings   Sub for Allevyn:   Hydrocellular Foam Non-Adh. 4x4 [id number] ....12    Hydrocellular Foam Non-Adh  3x3.....8

## 2024-08-19 ENCOUNTER — HOME CARE VISIT (OUTPATIENT)
Dept: HOME HEALTH SERVICES | Facility: HOME HEALTHCARE | Age: 64
End: 2024-08-19
Payer: COMMERCIAL

## 2024-08-19 VITALS
TEMPERATURE: 97.4 F | RESPIRATION RATE: 18 BRPM | OXYGEN SATURATION: 97 % | HEART RATE: 71 BPM | SYSTOLIC BLOOD PRESSURE: 138 MMHG | DIASTOLIC BLOOD PRESSURE: 84 MMHG

## 2024-08-19 PROCEDURE — G0299 HHS/HOSPICE OF RN EA 15 MIN: HCPCS

## 2024-08-26 ENCOUNTER — HOME CARE VISIT (OUTPATIENT)
Dept: HOME HEALTH SERVICES | Facility: HOME HEALTHCARE | Age: 64
End: 2024-08-26
Payer: COMMERCIAL

## 2024-08-26 VITALS
DIASTOLIC BLOOD PRESSURE: 64 MMHG | HEART RATE: 74 BPM | SYSTOLIC BLOOD PRESSURE: 116 MMHG | OXYGEN SATURATION: 99 % | RESPIRATION RATE: 16 BRPM | TEMPERATURE: 97.9 F

## 2024-08-26 PROCEDURE — G0299 HHS/HOSPICE OF RN EA 15 MIN: HCPCS

## 2024-09-03 ENCOUNTER — HOME CARE VISIT (OUTPATIENT)
Dept: HOME HEALTH SERVICES | Facility: HOME HEALTHCARE | Age: 64
End: 2024-09-03
Payer: COMMERCIAL

## 2024-09-03 VITALS
DIASTOLIC BLOOD PRESSURE: 88 MMHG | RESPIRATION RATE: 16 BRPM | SYSTOLIC BLOOD PRESSURE: 132 MMHG | TEMPERATURE: 95 F | HEART RATE: 64 BPM | OXYGEN SATURATION: 99 %

## 2024-09-03 PROCEDURE — G0300 HHS/HOSPICE OF LPN EA 15 MIN: HCPCS

## 2024-09-04 NOTE — CASE COMMUNICATION
THANK YOU    ----- Message -----  From: Ness Ruano RN  Sent: 9/3/2024  11:48 AM EDT  To: Asya Winters LPN      I sent an email and you are attached in it  ----- Message -----  From: Asya Winters LPN  Sent: 9/3/2024  11:19 AM EDT  To: Nichole Cortez RN; /[facility] Supplies    Inquiring about ETA of supplies ordered on 8/14/24.

## 2024-09-05 ENCOUNTER — HOME CARE VISIT (OUTPATIENT)
Dept: HOME HEALTH SERVICES | Facility: HOME HEALTHCARE | Age: 64
End: 2024-09-05
Payer: COMMERCIAL

## 2024-09-05 NOTE — CASE COMMUNICATION
"Sn sent note to supply orders to follow up on patient not receiving supply order in August. Received the return email notification from [employer]     \"We advised on 8/16 that the patient will need to speak with our billing department prior to us processing an additional order to bill insurance. The best number they can call is: [phone]\"     called patient and spoke with him to notify him of same. He called his insurance compan y yesterday and will also call [employer] today at this number to follow up. I informed him that he can also purchase supplies on his own through his insurance if that would be easier. He will look into this.     "

## 2024-09-09 ENCOUNTER — HOME CARE VISIT (OUTPATIENT)
Dept: HOME HEALTH SERVICES | Facility: HOME HEALTHCARE | Age: 64
End: 2024-09-09
Payer: COMMERCIAL

## 2024-09-09 PROCEDURE — G0299 HHS/HOSPICE OF RN EA 15 MIN: HCPCS

## 2024-09-10 VITALS
SYSTOLIC BLOOD PRESSURE: 107 MMHG | RESPIRATION RATE: 16 BRPM | OXYGEN SATURATION: 99 % | DIASTOLIC BLOOD PRESSURE: 77 MMHG | TEMPERATURE: 98.3 F | HEART RATE: 74 BPM

## 2024-09-16 ENCOUNTER — HOME CARE VISIT (OUTPATIENT)
Dept: HOME HEALTH SERVICES | Facility: HOME HEALTHCARE | Age: 64
End: 2024-09-16
Payer: COMMERCIAL

## 2024-09-19 ENCOUNTER — HOME CARE VISIT (OUTPATIENT)
Dept: HOME HEALTH SERVICES | Facility: HOME HEALTHCARE | Age: 64
End: 2024-09-19
Payer: COMMERCIAL

## 2024-09-19 VITALS
TEMPERATURE: 97.6 F | SYSTOLIC BLOOD PRESSURE: 100 MMHG | DIASTOLIC BLOOD PRESSURE: 70 MMHG | OXYGEN SATURATION: 99 % | RESPIRATION RATE: 18 BRPM | HEART RATE: 70 BPM

## 2024-09-19 PROCEDURE — G0299 HHS/HOSPICE OF RN EA 15 MIN: HCPCS

## 2024-09-23 ENCOUNTER — HOME CARE VISIT (OUTPATIENT)
Dept: HOME HEALTH SERVICES | Facility: HOME HEALTHCARE | Age: 64
End: 2024-09-23
Payer: COMMERCIAL

## 2024-09-23 VITALS
RESPIRATION RATE: 16 BRPM | HEART RATE: 64 BPM | TEMPERATURE: 97.4 F | OXYGEN SATURATION: 99 % | DIASTOLIC BLOOD PRESSURE: 82 MMHG | SYSTOLIC BLOOD PRESSURE: 105 MMHG

## 2024-09-23 PROCEDURE — G0299 HHS/HOSPICE OF RN EA 15 MIN: HCPCS

## 2024-09-23 NOTE — CASE COMMUNICATION
Please forward to:  Con Yousif DO   [facility] [address]   Phone: [phone]   Fax: [phone]     Home Health need continues for: weekly visits fro wound check  Current level of functional ability: pt is total dependent care  Homebound status and living arrangements: pt is bedbound and unable to ambulate  Goals accomplished and/or measurable progress toward unmet goals in past 60 days: for wounds to continue t o heal  Focus of care for next 60 days for each discipline ordered: weekly visits for wound checks  Skin integrity/wound status: wound remains on L elbow, R side of back, R nephrostomy tube, L side of chest  Code status:   Most recent fall risk: high

## 2024-09-30 ENCOUNTER — HOME CARE VISIT (OUTPATIENT)
Dept: HOME HEALTH SERVICES | Facility: HOME HEALTHCARE | Age: 64
End: 2024-09-30
Payer: COMMERCIAL

## 2024-09-30 VITALS
DIASTOLIC BLOOD PRESSURE: 88 MMHG | HEART RATE: 78 BPM | OXYGEN SATURATION: 99 % | RESPIRATION RATE: 16 BRPM | TEMPERATURE: 97.4 F | SYSTOLIC BLOOD PRESSURE: 138 MMHG

## 2024-09-30 PROCEDURE — G0299 HHS/HOSPICE OF RN EA 15 MIN: HCPCS

## 2024-09-30 PROCEDURE — 400014 VN F/U

## 2024-10-04 ENCOUNTER — HOSPITAL ENCOUNTER (OUTPATIENT)
Dept: RADIOLOGY | Facility: HOSPITAL | Age: 64
Discharge: HOME | End: 2024-10-04
Attending: RADIOLOGY
Payer: COMMERCIAL

## 2024-10-04 DIAGNOSIS — N13.2 HYDRONEPHROSIS WITH OBSTRUCTING CALCULUS: Primary | ICD-10-CM

## 2024-10-04 PROCEDURE — C1729 CATH, DRAINAGE: HCPCS

## 2024-10-04 PROCEDURE — 50435 EXCHANGE NEPHROSTOMY CATH: CPT | Performed by: RADIOLOGY

## 2024-10-04 PROCEDURE — C1769 GUIDE WIRE: HCPCS

## 2024-10-04 PROCEDURE — 50435 EXCHANGE NEPHROSTOMY CATH: CPT

## 2024-10-04 RX ADMIN — IOHEXOL 10 ML: 350 INJECTION, SOLUTION INTRAVENOUS at 10:16

## 2024-10-04 NOTE — DISCHARGE INSTRUCTIONS
"                                                                      TUBE CARE INSTRUCTIONS    Care after your procedure:    Resume your normal diet. Small sips of flat soda will help with nausea.    1. The properly functioning catheter should be forward flushed once (1x) daily with 10ml of normal saline using clean technique. You will be given a prescription for flushes.   To flush the tube, clean both connections with alcohol swab.Twist off the drainage bag/ bulb  tubing and twist the saline syringe into the drainage tube and flush. Remove the syringe and twist the drainage bag / bulb tubing tubing back on.    2. The drainage bag/bulb may be emptied as necessary. Keep a record of the amount of fluid you drain from your tube. This should be done with clean technique as well.     3. A fresh dressing should be applied daily over the tube insertion site.     4. As the tube is secured to the skin with only a suture,try not to pull on your tube. Tub baths are not permitted. Showers are permitted if the patient's skin entry site is prevented from getting wet. Similarly, washcloth \"baths\" are acceptable.     Contact Interventional Radiology at [phone] if:    1. Leakage or large amounts of liquid around the catheter.    2. Fever of 101 degrees lasting several hours without other obvious cause (such as sore throat, flu, etc).    3. Persistent nausea or vomiting.    4. Diminished drainage, which may be associated with pressure or pain. Or when the     drainage from your tube is less than 10mls for 48 hours.    5. Catheter pulled back or falls out.      The following pharmacies carry the flush syringes.       [facility]                     [place] [facility] [address]                         [phone]  [place]                       [place]  Phone [phone]            Phone [phone]                         " "                                                                              Hubert Yu   [facility]             [facility]                                [phone]  [address]                      [address] [facility]  [facility]  Phone [phone]            Phone [phone]                      [facility]                                                                                                          [phone]  [facility]  [address]  [facility]  Phone [phone]                                                                      TUBE CARE INSTRUCTIONS    Care after your procedure:    Resume your normal diet. Small sips of flat soda will help with nausea.    1. The properly functioning catheter should be forward flushed once (1x) daily with 10ml of normal saline using clean technique. You will be given a prescription for flushes.   To flush the tube, clean both connections with alcohol swab.Twist off the drainage bag/ bulb  tubing and twist the saline syringe into the drainage tube and flush. Remove the syringe and twist the drainage bag / bulb tubing tubing back on.    2. The drainage bag/bulb may be emptied as necessary. Keep a record of the amount of fluid you drain from your tube. This should be done with clean technique as well.     3. A fresh dressing should be applied daily over the tube insertion site.     4. As the tube is secured to the skin with only a suture,try not to pull on your tube. Tub baths are not permitted. Showers are permitted if the patient's skin entry site is prevented from getting wet. Similarly, washcloth \"baths\" are acceptable.     Contact Interventional Radiology at [phone] if:    1. Leakage or large amounts of liquid around " the catheter.    2. Fever of 101 degrees lasting several hours without other obvious cause (such as sore throat, flu, etc).    3. Persistent nausea or vomiting.    4. Diminished drainage, which may be associated with pressure or pain. Or when the     drainage from your tube is less than 10mls for 48 hours.    5. Catheter pulled back or falls out.      The following pharmacies carry the flush syringes.       [facility]                     [place] [facility]                             [facility]  [address]                     [address] [facility]                         [phone]  [place]                       [place]  Phone [phone]            Phone [phone]                                                                                                       [facility]             [facility]                                [phone]  [address]                      [address] [facility]  [facility] [place]  Phone [phone]            Phone [phone]                      [facility]                                                                                                          [phone]  [facility]  [address]  [place]                                                                               [facility]  Phone [phone]

## 2024-10-04 NOTE — BRIEF OP NOTE (RAD/CATH)
INTERVENTIONAL RADIOLOGY PROCEDURE NOTE    Date: 10/4/2024    Procedure:   Procedure Summary       Date: 10/04/24 Room / Location: [facility] Interventional Radiology    Anesthesia Start:  Anesthesia Stop:     Procedure: IR NEPHROSTOMY TUBE CHECK/CHANGE/REPOSITION/REINSERTION/UPSIZE Diagnosis:       Hydronephrosis with obstructing calculus      (routine 2 month changem)    Scheduled Providers:  Responsible Provider:     Anesthesia Type: Not recorded ASA Status: Not recorded            Preoperative diagnosis:   1. Hydronephrosis with obstructing calculus         Postoperative diagnosis: Same.    Surgeon: Matty Mcdaniel MD     Assistant: None. No qualified resident was available.    Blood loss: none    Specimens: none     Findings: Right PCN exchanged     Complications: None immediate.    Anesthesia: local

## 2024-10-07 ENCOUNTER — HOME CARE VISIT (OUTPATIENT)
Dept: HOME HEALTH SERVICES | Facility: HOME HEALTHCARE | Age: 64
End: 2024-10-07
Payer: COMMERCIAL

## 2024-10-07 ENCOUNTER — LAB REQUISITION (OUTPATIENT)
Dept: LAB | Facility: HOSPITAL | Age: 64
End: 2024-10-07
Payer: COMMERCIAL

## 2024-10-07 VITALS
TEMPERATURE: 97.3 F | SYSTOLIC BLOOD PRESSURE: 102 MMHG | OXYGEN SATURATION: 99 % | HEART RATE: 89 BPM | DIASTOLIC BLOOD PRESSURE: 70 MMHG | RESPIRATION RATE: 18 BRPM

## 2024-10-07 DIAGNOSIS — R31.9 HEMATURIA, UNSPECIFIED: ICD-10-CM

## 2024-10-07 LAB
BACTERIA UR QL AUTO: ABNORMAL /HPF
BILIRUB UR QL STRIP: NEGATIVE
CLARITY UR: ABNORMAL
COLOR UR: ABNORMAL
GLUCOSE UR STRIP-MCNC: NEGATIVE MG/DL
HGB UR QL STRIP.AUTO: ABNORMAL
KETONES UR STRIP-MCNC: NEGATIVE MG/DL
LEUKOCYTE ESTERASE UR QL STRIP: ABNORMAL
NITRITE UR QL STRIP: POSITIVE
NON-SQ EPI CELLS URNS QL MICRO: ABNORMAL /HPF
PH UR STRIP.AUTO: 7 [PH]
PROT UR STRIP-MCNC: ABNORMAL MG/DL
RBC #/AREA URNS AUTO: ABNORMAL /HPF
SP GR UR STRIP.AUTO: 1.01 (ref 1–1.03)
UROBILINOGEN UR STRIP-ACNC: <2 MG/DL
WBC #/AREA URNS AUTO: ABNORMAL /HPF
WBC CLUMPS # UR AUTO: PRESENT /UL

## 2024-10-07 PROCEDURE — 81001 URINALYSIS AUTO W/SCOPE: CPT | Performed by: INTERNAL MEDICINE

## 2024-10-07 PROCEDURE — 87086 URINE CULTURE/COLONY COUNT: CPT | Performed by: INTERNAL MEDICINE

## 2024-10-07 PROCEDURE — G0299 HHS/HOSPICE OF RN EA 15 MIN: HCPCS

## 2024-10-09 LAB — BACTERIA UR CULT: NORMAL

## 2024-10-14 ENCOUNTER — HOME CARE VISIT (OUTPATIENT)
Dept: HOME HEALTH SERVICES | Facility: HOME HEALTHCARE | Age: 64
End: 2024-10-14
Payer: COMMERCIAL

## 2024-10-14 VITALS
DIASTOLIC BLOOD PRESSURE: 86 MMHG | TEMPERATURE: 98 F | RESPIRATION RATE: 16 BRPM | OXYGEN SATURATION: 95 % | HEART RATE: 90 BPM | SYSTOLIC BLOOD PRESSURE: 108 MMHG

## 2024-10-14 PROCEDURE — G0299 HHS/HOSPICE OF RN EA 15 MIN: HCPCS

## 2024-10-21 ENCOUNTER — HOME CARE VISIT (OUTPATIENT)
Dept: HOME HEALTH SERVICES | Facility: HOME HEALTHCARE | Age: 64
End: 2024-10-21
Payer: COMMERCIAL

## 2024-10-21 VITALS
DIASTOLIC BLOOD PRESSURE: 88 MMHG | SYSTOLIC BLOOD PRESSURE: 140 MMHG | HEART RATE: 74 BPM | TEMPERATURE: 97.8 F | RESPIRATION RATE: 18 BRPM | OXYGEN SATURATION: 98 %

## 2024-10-21 PROCEDURE — G0299 HHS/HOSPICE OF RN EA 15 MIN: HCPCS

## 2024-10-23 ENCOUNTER — TELEPHONE (OUTPATIENT)
Dept: OTHER | Facility: OTHER | Age: 64
End: 2024-10-23

## 2024-10-23 NOTE — TELEPHONE ENCOUNTER
Patient's wife is calling regarding cancelling an appointment.    Date/Time:10/23/24 @ 10:30 am    Patient was rescheduled: YES [] NO [x]    Patient's wife requesting call back to reschedule: YES [] NO [x]    PT IS NOT FEELING WELL, WIFE WILL C/B TO R/S

## 2024-10-31 ENCOUNTER — HOME CARE VISIT (OUTPATIENT)
Dept: HOME HEALTH SERVICES | Facility: HOME HEALTHCARE | Age: 64
End: 2024-10-31
Payer: COMMERCIAL

## 2024-10-31 VITALS
DIASTOLIC BLOOD PRESSURE: 78 MMHG | RESPIRATION RATE: 16 BRPM | OXYGEN SATURATION: 98 % | HEART RATE: 76 BPM | SYSTOLIC BLOOD PRESSURE: 118 MMHG | TEMPERATURE: 98 F

## 2024-10-31 PROCEDURE — G0299 HHS/HOSPICE OF RN EA 15 MIN: HCPCS

## 2024-11-07 ENCOUNTER — HOME CARE VISIT (OUTPATIENT)
Dept: HOME HEALTH SERVICES | Facility: HOME HEALTHCARE | Age: 64
End: 2024-11-07
Payer: COMMERCIAL

## 2024-11-07 VITALS
RESPIRATION RATE: 18 BRPM | TEMPERATURE: 98.1 F | OXYGEN SATURATION: 98 % | SYSTOLIC BLOOD PRESSURE: 122 MMHG | DIASTOLIC BLOOD PRESSURE: 80 MMHG | HEART RATE: 62 BPM

## 2024-11-07 PROCEDURE — G0299 HHS/HOSPICE OF RN EA 15 MIN: HCPCS

## 2024-11-07 NOTE — CASE COMMUNICATION
Hello, I had a home visit w/ your pt today. The urine in his nephrostomy is bloody & the sutures are out. This has been the case since I saw the pt last week. The pt called IR last week to make them aware. Pt reported during this SNV that his insurance will not keep paying for him to keep returning to IR. The nephrostomy is currently draining well and the pt is not in any distress. I just wanted to make you aware. Thank you.

## 2024-11-11 ENCOUNTER — TELEPHONE (OUTPATIENT)
Dept: NEPHROLOGY | Facility: CLINIC | Age: 64
End: 2024-11-11

## 2024-11-11 NOTE — TELEPHONE ENCOUNTER
Called patient and left a voicemail asking to please call the office back and to clarify what the consult appointment is specifically for as no notes are in the chart.     When patient calls back please ask patient or fwd the call to the office. Thank you.

## 2024-11-13 ENCOUNTER — HOME CARE VISIT (OUTPATIENT)
Dept: HOME HEALTH SERVICES | Facility: HOME HEALTHCARE | Age: 64
End: 2024-11-13
Payer: COMMERCIAL

## 2024-11-13 VITALS
OXYGEN SATURATION: 97 % | HEART RATE: 83 BPM | RESPIRATION RATE: 18 BRPM | DIASTOLIC BLOOD PRESSURE: 76 MMHG | SYSTOLIC BLOOD PRESSURE: 104 MMHG | TEMPERATURE: 98.1 F

## 2024-11-13 PROCEDURE — G0299 HHS/HOSPICE OF RN EA 15 MIN: HCPCS

## 2024-11-13 NOTE — CASE COMMUNICATION
Ship to x Pt. Home  Branch: maday Yousif, DO    Wound 1 R upper back Partial x   Frequency 3x/wk  Wound 2 R hip    Partial x    Frequency 3x/wk  Wound 3 R lower back Partial x Frequency 3x/wk  Wound 4 L elbow Partial x Frequency 3x/wk  Wound 5 R elbow Partial x Frequency 3x/wk    Gauze 4x4 N/S 200 4x4s per unit  [id number]  Telfa pad 3x4 9973 20  Hydrocellular Foam Non-Adh. 4x4 [id number]

## 2024-11-19 ENCOUNTER — HOME CARE VISIT (OUTPATIENT)
Dept: HOME HEALTH SERVICES | Facility: HOME HEALTHCARE | Age: 64
End: 2024-11-19
Payer: COMMERCIAL

## 2024-11-19 PROCEDURE — G0299 HHS/HOSPICE OF RN EA 15 MIN: HCPCS

## 2024-11-20 VITALS
TEMPERATURE: 98 F | RESPIRATION RATE: 16 BRPM | HEART RATE: 75 BPM | SYSTOLIC BLOOD PRESSURE: 130 MMHG | DIASTOLIC BLOOD PRESSURE: 68 MMHG | OXYGEN SATURATION: 100 %

## 2024-11-20 NOTE — CASE COMMUNICATION
Medication discrepancies or Major drug interactions: Doxepin & venlafaxine  Abnormal clinical findings: Multiple wounds, nephrostomy not sutured in (sutures keep pulling out)  This report is informational only, no response is needed  [facility] has Resumed your patient to Home Health service with the following disciplines:   Patient stated goals of care: For my wounds to heal  Potential barriers to goal achievement: Slow healing &  quadriplegia  Primary focus of home health care:Wound  Anticipated visit pattern and next visit date: 1w4, 11/26/24  Thank you for allowing us to participate in the care of your patient.

## 2024-11-22 ENCOUNTER — TELEPHONE (OUTPATIENT)
Age: 64
End: 2024-11-22

## 2024-11-22 DIAGNOSIS — L03.90 CELLULITIS: Primary | ICD-10-CM

## 2024-11-22 RX ORDER — SULFAMETHOXAZOLE AND TRIMETHOPRIM 800; 160 MG/1; MG/1
1 TABLET ORAL EVERY 12 HOURS SCHEDULED
Qty: 14 TABLET | Refills: 0 | Status: SHIPPED | OUTPATIENT
Start: 2024-11-22 | End: 2024-11-29

## 2024-11-22 NOTE — TELEPHONE ENCOUNTER
[facility] will FAX forms for Dr Roberts to sign.    Regarding order placed.    Return fax number [phone]  call back number [phone]  to [facility]

## 2024-11-25 ENCOUNTER — TELEPHONE (OUTPATIENT)
Dept: PALLIATIVE MEDICINE | Facility: CLINIC | Age: 64
End: 2024-11-25

## 2024-11-26 ENCOUNTER — HOME CARE VISIT (OUTPATIENT)
Dept: HOME HEALTH SERVICES | Facility: HOME HEALTHCARE | Age: 64
End: 2024-11-26
Payer: COMMERCIAL

## 2024-11-26 VITALS
SYSTOLIC BLOOD PRESSURE: 124 MMHG | TEMPERATURE: 97.9 F | DIASTOLIC BLOOD PRESSURE: 82 MMHG | RESPIRATION RATE: 18 BRPM | OXYGEN SATURATION: 99 % | HEART RATE: 73 BPM

## 2024-11-26 PROCEDURE — G0299 HHS/HOSPICE OF RN EA 15 MIN: HCPCS

## 2024-12-03 ENCOUNTER — TELEPHONE (OUTPATIENT)
Dept: PALLIATIVE MEDICINE | Facility: CLINIC | Age: 64
End: 2024-12-03

## 2024-12-03 NOTE — TELEPHONE ENCOUNTER
Left message for pt stating if they would prefer the 11 am or 1130 am slot on 12/17. It seems to have been a mistake and scheduled for both times.

## 2024-12-05 ENCOUNTER — HOME CARE VISIT (OUTPATIENT)
Dept: HOME HEALTH SERVICES | Facility: HOME HEALTHCARE | Age: 64
End: 2024-12-05
Payer: COMMERCIAL

## 2024-12-05 PROCEDURE — G0299 HHS/HOSPICE OF RN EA 15 MIN: HCPCS

## 2024-12-06 ENCOUNTER — HOSPITAL ENCOUNTER (OUTPATIENT)
Dept: RADIOLOGY | Facility: HOSPITAL | Age: 64
Discharge: HOME | End: 2024-12-06
Attending: RADIOLOGY | Admitting: RADIOLOGY
Payer: COMMERCIAL

## 2024-12-06 DIAGNOSIS — N13.2 HYDRONEPHROSIS WITH OBSTRUCTING CALCULUS: ICD-10-CM

## 2024-12-06 PROCEDURE — C1769 GUIDE WIRE: HCPCS

## 2024-12-06 PROCEDURE — 50435 EXCHANGE NEPHROSTOMY CATH: CPT

## 2024-12-06 PROCEDURE — C1729 CATH, DRAINAGE: HCPCS

## 2024-12-06 PROCEDURE — 50435 EXCHANGE NEPHROSTOMY CATH: CPT | Performed by: RADIOLOGY

## 2024-12-06 NOTE — DISCHARGE INSTRUCTIONS
Nephrostomy Tube Care     WHAT YOU NEED TO KNOW:   A nephrostomy tube is a catheter (thin plastic tube) that is inserted through your skin and into your kidney. The nephrostomy tube drains urine from your kidney into a collecting bag outside your body. You may need a nephrostomy tube when something is blocking the normal flow of urine. A nephrostomy tube may be used for a short or a long period of time. The nephrostomy tube comes out of your back, so you will need someone to help care for your nephrostomy tube.        DISCHARGE INSTRUCTIONS:      How to clean the skin around the nephrostomy tube and change the bandage:  Since the nephrostomy tube comes out of your back, you will not be able to care for it by yourself. Ask someone to follow the general directions below to check and care for your nephrostomy tube.   Gather the items you will need.          Disposable (single use) under-pad, and a clean washcloth  Plain soap, warm water, and new medical gloves  Sterile gauze bandages  Clear adhesive dressing or medical tape  Skin barrier  Protective skin film  Trash bag  Remove the old bandage, and check the tube entry site.    Have the patient lie on his side with the nephrostomy tube entry site facing up. Place the under-pad where it will catch drainage as you are working with the nephrostomy tube.   Wash your hands with soap and water. Put on new medical gloves.  Gently remove the old bandage, without pulling on the tube. Do this by holding the skin beside the tube with one hand. With the other hand, gently remove sticky tape and the skin barrier by pulling in the same direction as hair growth. Do not touch the side of the bandage that is placed over or around the tube. Throw the bandage and skin barrier away in a trash bag.  Look for signs of infection, such as skin redness and swelling. Report any skin changes to healthcare providers.  Clean the tube entry site.    Hold the tube in place to keep it from being  pulled out while you are cleaning around it.  You will need to clean the area twice. For the first cleaning, wet a new gauze bandage with soap and water.  Begin at the entry site of the tube. Wipe the skin in circles, moving away from the entry site. Remove blood and any other material with the gauze. Do this as often as needed. Use a new gauze bandage each time you clean the area, moving away from the entry site.   For the second cleaning, wet a new gauze bandage with water. Begin at the entry site of the tube. Wipe the skin in circles, moving away from the entry site. Use a new gauze bandage each time you clean the area, moving away from the entry site.   Gently pat the skin with a clean washcloth to dry it.    Apply the skin barrier and bandages.    Roll up a bandage to make it thick, and place it under  the place where the tube enters the skin. Place it to support the tube, and stop it from kinking or bending. Tape the bandage in place, and apply more bandages if directed by a healthcare provider.   Bring the tubing forward to the front and tape it to the skin. Do not stretch the tube tight, because this may pull the nephrostomy tube out.  How often to change the bandage.  Change the bandage around the tube, every other day. If your bandages  get dirty or wet, change them right away, and as often as needed. If your nephrostomy tube is to be used for a long period of time, the tube needs to be changed every 2 to 3 months. Healthcare providers will tell you when you need to make an appointment to have your tube changed.     How to care for the urine drainage bag:   Ask if you need to measure and write down how much urine is in the bag before you empty it. Drain urine out of the drainage bag when it is ½ to ? full. Open the spout at the bottom of the bag to empty the urine into the toilet.   You may need to detach the drainage bag from the nephrostomy tube to change it.. If so, attach a new drainage bag tightly to  the nephrostomy tube.     How to prevent problems with your nephrostomy tube:   Change bandages, directed.  This helps to prevent infection. Throw away or clean your drainage bag as directed by your healthcare provider.    Wipe the connecting ends of the drainage bag with alcohol before you reconnect the bag to the tube.  This helps prevent infection.     Keep the tube taped to your skin and connected to a drainage bag placed below the level of your kidneys.  This helps prevent urine from backing up into your kidneys. You may wear a small drainage bag strapped to your leg to let you move around more easily.    Check the catheter to be sure it is in place after you change your clothes or do other activities.  Do not wear tight clothing over the tube. Place the tubing over your thigh rather than under it when you are sitting down. Be sure that nothing is pulling on the nephrostomy tube when you move around.    Change positions if you see little or no urine in your drainage bag.  Check to see if the urine tube is twisted or bent. Be sure that you are not sitting or lying on the tube. If there are no kinks and there is little or no urine in the drainage bag, tell your healthcare provider.    Flush out the tube as directed. Some tubes get flushed one time a day with 10 mls of NSS You will be given a prescription for the flushes.  To flush the nephrostomy tube, clean both connections with alcohol swap. Twist off the drainage bag tube and twist the saline syringe into the nephrostomy tube and flush briskly. Remove the syringe and twist the drainage bag tube back into the nephrostomy tube.  Keep the site covered while you shower.  Tape a piece of clear adhesive plastic over the dressing to keep it dry while you shower. Do not take tub baths.    Contact Interventional Radiology at [phone] ([facility] PATIENTS: Contact Interventional Radiology at [phone]) ([facility] PATIENTS: Contact Interventional Radiology at  [phone]) if:  The skin around the nephrostomy tube is red, swollen, itches, or has a rash.   You have a fever greater than 101 or chills.  You have lower back or hip pain.  There are changes in how your urine looks or smells.  You have little or no urine draining from the nephrostomy tube.   You have nausea and are vomiting.  The black elijah on your tube has moved, or the tube is longer than when it was put in.   You have questions or concerns about your condition or care.  The nephrostomy tube comes out completely.   There is blood, pus, or a bad smell coming from the place where the tube enters your skin.  Urine is leaking around the tube.        The following pharmacies carry the flush syringes.       [facility]                     [address]                     [address] [facility]                    [phone]  [place]                       [place]  Phone [phone]            Phone [phone]                 [facility]                                                                                                   [phone]  [facility]             [facility]                             [address] [place], [place] [address] [facility]   [place]                                 [phone]  Phone [phone]            Phone [phone]    [facility]                                                                         [facility] [phone]  [place]  [place]   Phone [phone]

## 2024-12-06 NOTE — H&P
Interventional Radiology  History and Physical 12/6/2024     Herminio Roth   1960   [id number]    Assessment/Plan:  64 year old male with history of hydronephrosis due to obstructing renal calculus returns for routine 2 month right PCN exchange.    Problem List Items Addressed This Visit          Genitourinary    Hydronephrosis with obstructing calculus    Relevant Orders    IR nephrostomy tube check/change/reposition/reinsertion/upsize          Subjective:     Patient ID: Herminio Roth is a 64 y.o. male.    History of Present Illness  Patient with history of hydronephrosis due to obstructing renal calculus returns for routine 2 month right PCN exchange.    Review of Systems      Past Medical History:   Diagnosis Date    Anemia     CHF (congestive heart failure) (HCC)     Diabetes mellitus (HCC)     Hypertension     Seizures (HCC)     Stroke (HCC)         Past Surgical History:   Procedure Laterality Date    IR NEPHROSTOMY TUBE CHECK/CHANGE/REPOSITION/REINSERTION/UPSIZE  5/28/2024    IR NEPHROSTOMY TUBE CHECK/CHANGE/REPOSITION/REINSERTION/UPSIZE  7/26/2024    IR NEPHROSTOMY TUBE CHECK/CHANGE/REPOSITION/REINSERTION/UPSIZE  10/4/2024    SPINE SURGERY          Social History     Tobacco Use   Smoking Status Never   Smokeless Tobacco Never        Social History     Substance and Sexual Activity   Alcohol Use Not Currently        Social History     Substance and Sexual Activity   Drug Use Never        Allergies   Allergen Reactions    Dopamine Shortness Of Breath    Cefepime Hives     clotilde. piperacillin    Ciprofloxacin GI Intolerance    Daptomycin Other (See Comments)     elevated cpk,clotilde vanco       Current Outpatient Medications   Medication Sig Dispense Refill    Accu-Chek Guide test strip       acetaminophen (TYLENOL) 500 mg tablet Take 500 mg by mouth every 4 (four) hours as needed for moderate pain. Indications: Pain      BD PosiFlush 0.9 % SOLN       captopril (CAPOTEN) 12.5 mg tablet Take 12.5  mg by mouth 4 (four) times a day as needed (High bp). Indications: High Blood Pressure Disorder      clotrimazole (MYCELEX) 10 mg justin Take 10 mg by mouth 3 (three) times a day As needed for oropharyngeal candidiasis      collagenase (SANTYL) ointment Apply topically daily (Patient taking differently: Apply 1 Application topically daily) 90 g 5    doxepin (SINEquan) 10 mg capsule Take 10 mg by mouth daily as needed for sleep. (takes for itching)      famotidine (PEPCID) 40 MG tablet One tablet by mouth daily.      ferrous sulfate (FeroSul) 325 (65 Fe) mg tablet Take 325 mg by mouth in the morning. Indications: Iron Deficiency      gabapentin (NEURONTIN) 400 mg capsule Take 1,200 mg by mouth 3 (three) times a day      hydrOXYzine HCL (ATARAX) 25 mg tablet Take 25 mg by mouth daily as needed for itching      losartan (COZAAR) 25 mg tablet Take 25 mg by mouth daily as needed (Hypertention) TAKES 50MG DAILY IF NEEDED FOR ELEVATED BP READINGS -SCRIPT IN HOME      Magnesium 250 MG TABS Take 1 tablet by mouth every evening      metFORMIN (GLUCOPHAGE) 500 mg tablet Take 500 mg by mouth daily as needed (high blood sugar).      Metformin HCl (RIOMET) 500 MG/5ML oral solution Take 500 mg by mouth in the morning      midodrine (PROAMATINE) 2.5 mg tablet Take 2.5 mg by mouth 2 (two) times a day as needed (Low BP readings)      minocycline (DYNACIN) 50 MG tablet Take 50 mg by mouth 2 (two) times a day      montelukast (SINGULAIR) 10 mg tablet Take 10 mg by mouth in the morning      Morphine Sulfate, Concentrate, 20 mg/mL concentrated solution Take 10mg PO as needed every 1 hour for breathlessness, or 15mg PO as needed every 2 hours for pain 30 mL 0    Multiple Vitamins-Minerals (ONE A DAY MENS VITACRAVES PO) Take 1 tablet by mouth daily.      multivitamin (THERAGRAN) TABS Take 1 tablet by mouth daily      naloxone (NARCAN) 4 mg/0.1 mL nasal spray Administer 1 spray into a nostril. If no response after 2-3 minutes, give another  "dose in the other nostril using a new spray. (Patient taking differently: 1 spray into each nostril every 3 (three) minutes as needed for opioid reversal or respiratory depression. Administer 1 spray into a nostril. If no response after 2-3 minutes, give another dose in the other nostril using a new spray.  Indications: Opioid Overdose) 1 each 1    omeprazole (PriLOSEC) 40 MG capsule       Potassium Gluconate 595 (99 K) MG TABS Take 595 mg by mouth in the morning. taking 99mg daily  Indications: Low Amount of Potassium in the Blood      rosuvastatin (CRESTOR) 20 MG tablet Take 20 mg by mouth daily      venlafaxine (EFFEXOR) 100 MG tablet Take 100 mg by mouth Three times a day      Zinc 50 MG TABS Take 50 mg by mouth in the morning. Indications: Impaired Wound Healing       No current facility-administered medications for this encounter.          Objective:    There were no vitals filed for this visit.     Physical Exam  Genitourinary:     Comments: Right PCN in place.          No results found for: \"BNP\"   No results found for: \"WBC\", \"HGB\", \"HCT\", \"MCV\", \"PLT\"  Lab Results   Component Value Date    INR 1.0 09/22/2023    INR 1.2 07/18/2023    INR 1.0 05/24/2020     No results found for: \"PTT\"      I have personally reviewed pertinent imaging and laboratory results.     Code Status: No Order  Advance Directive and Living Will:      Power of : Yes  POLST:      This text is generated with voice recognition software. There may be translation, syntax,  or grammatical errors. If you have any questions, please contact the dictating provider.   "

## 2024-12-06 NOTE — BRIEF OP NOTE (RAD/CATH)
INTERVENTIONAL RADIOLOGY PROCEDURE NOTE    Date: 12/6/2024    Procedure:   Procedure Summary       Date: 12/06/24 Room / Location: [facility] Interventional Radiology    Anesthesia Start:  Anesthesia Stop:     Procedure: IR NEPHROSTOMY TUBE CHECK/CHANGE/REPOSITION/REINSERTION/UPSIZE Diagnosis:       Hydronephrosis with obstructing calculus      (routine)    Scheduled Providers:  Responsible Provider:     Anesthesia Type: Not recorded ASA Status: Not recorded            Preoperative diagnosis:   1. Hydronephrosis with obstructing calculus         Postoperative diagnosis: Same.    Surgeon: Tim Vega MD     Assistant: None. No qualified resident was available.    Blood loss: None    Specimens: None     Findings: Right PCN exchange using 10.2 Fr 60 cm catheter.    Complications: None immediate.    Anesthesia: local

## 2024-12-07 VITALS
SYSTOLIC BLOOD PRESSURE: 158 MMHG | OXYGEN SATURATION: 99 % | RESPIRATION RATE: 18 BRPM | TEMPERATURE: 98.1 F | DIASTOLIC BLOOD PRESSURE: 92 MMHG | HEART RATE: 67 BPM

## 2024-12-12 ENCOUNTER — HOME CARE VISIT (OUTPATIENT)
Dept: HOME HEALTH SERVICES | Facility: HOME HEALTHCARE | Age: 64
End: 2024-12-12
Payer: COMMERCIAL

## 2024-12-12 PROCEDURE — G0299 HHS/HOSPICE OF RN EA 15 MIN: HCPCS

## 2024-12-13 ENCOUNTER — HOME CARE VISIT (OUTPATIENT)
Dept: HOME HEALTH SERVICES | Facility: HOME HEALTHCARE | Age: 64
End: 2024-12-13
Payer: COMMERCIAL

## 2024-12-13 VITALS
SYSTOLIC BLOOD PRESSURE: 98 MMHG | RESPIRATION RATE: 18 BRPM | DIASTOLIC BLOOD PRESSURE: 62 MMHG | HEART RATE: 72 BPM | TEMPERATURE: 97.7 F | OXYGEN SATURATION: 98 %

## 2024-12-13 NOTE — CASE COMMUNICATION
Hello! I am referring my pt Herminio Roth to you. He is a quadriplegic who lies primarily on his R side. D/t this, he has multiple wounds on his R. Some of them are making progress, but the wounds to his R lower back, R hip, & R upper thigh seem to be getting worse. He, his wife, & his caregivers have been managing the wounds on their own thus far solely by using barrier cream to the periwound and foam dressings for padding. The  pt does not want his wounds managed by the [facility] b/c he feels they make the wounds worse. His PCP does not have the resources to assess the pt's wounds, d/t it being a small independent practice. The Palliative Care doctor (Stefan Roberts MD) has agreed to oversee the WC instead. Please let me know if you have any questions. Thank you!

## 2024-12-13 NOTE — Clinical Note
I will see him Tuesday  ----- Message -----  From: Nichole Cortez RN  Sent: 12/13/2024   6:35 PM EST  To: WHITNEY Deng! I am referring my pt Herminio Roth to you. He is a quadriplegic who lies primarily on his R side. D/t this, he has multiple wounds on his R. Some of them are making progress, but the wounds to his R lower back, R hip, & R upper thigh seem to be getting worse. He, his wife, & his caregivers have been managing the wounds on their own thus far solely by using barrier cream to the periwound and foam dressings for padding. The pt does not want his wounds managed by the [facility] b/c he feels they make the wounds worse. His PCP does not have the resources to assess the pt's wounds, d/t it being a small independent practice. The Palliative Care doctor (Stefan Roberts MD) has agreed to oversee the WC instead. Please let me know if you have any questions. Thank you!

## 2024-12-17 ENCOUNTER — OFFICE VISIT (OUTPATIENT)
Dept: PALLIATIVE MEDICINE | Facility: CLINIC | Age: 64
End: 2024-12-17

## 2024-12-17 VITALS
OXYGEN SATURATION: 98 % | HEART RATE: 77 BPM | SYSTOLIC BLOOD PRESSURE: 90 MMHG | TEMPERATURE: 96.9 F | DIASTOLIC BLOOD PRESSURE: 52 MMHG | RESPIRATION RATE: 18 BRPM

## 2024-12-17 DIAGNOSIS — G82.50 QUADRIPLEGIA (HCC): ICD-10-CM

## 2024-12-17 DIAGNOSIS — G89.0 CENTRAL PAIN SYNDROME: Primary | ICD-10-CM

## 2024-12-17 DIAGNOSIS — L89.213 PRESSURE INJURY OF RIGHT HIP, STAGE 3 (HCC): ICD-10-CM

## 2024-12-17 DIAGNOSIS — R06.02 SHORTNESS OF BREATH: ICD-10-CM

## 2024-12-17 DIAGNOSIS — Z51.5 PALLIATIVE CARE BY SPECIALIST: ICD-10-CM

## 2024-12-17 DIAGNOSIS — I35.0 SEVERE AORTIC STENOSIS: ICD-10-CM

## 2024-12-17 NOTE — PROGRESS NOTES
Name: Herminio Roth      : 1960      MRN: [id number]  Encounter Provider: Stefan Roberts MD  Encounter Date: 2024   Encounter department: [facility] PALLI[facility]IVE [facility] [place]  :  Assessment & Plan  Central pain syndrome  Using 15mg PRN morphine for pain.  No changes.        Quadriplegia (HCC)         Palliative care by specialist  Follow up 3 months.         Shortness of breath  10mg roxanol for PRN breathlessness        Pressure injury of right hip, stage 3 (HCC)  I will continue to support his home woundcare as needed.        Severe aortic stenosis               PDMP Review: I have reviewed the patient's controlled substance dispensing history in the Prescription Drug Monitoring Program in compliance with the [facility] regulations before prescribing any controlled substances.    History of Present Illness   Herminio Roth is a 64 y.o. male with history of severe AS and quadriplegia who presents for follow up.  Since our last visit, he has done very well with wound care. Multiple wounds are healing slower than he would like, and some have re-opened, but mostly he is improving.  Greatly appreciates his current home team.  Otherwise, he is feeling better overall and finds his breathlessness is responding well to 10mg morphine, when needed.  Has been traveling more and is excited for the upcoming holiday.  Pain is also reported as well managed.            Objective   BP 90/52 (BP Location: Right arm, Patient Position: Sitting, Cuff Size: Standard)   Pulse 77   Temp (!) 96.9 °F (36.1 °C) (Temporal)   Resp 18   SpO2 98%     Physical Exam  Vitals and nursing note reviewed.   Constitutional:       General: He is not in acute distress.     Appearance: He is ill-appearing. He is not toxic-appearing or diaphoretic.   HENT:      Head: Normocephalic and atraumatic.      Right Ear: External ear normal.      Left Ear: External ear normal.      Nose: Nose normal.      Mouth/Throat:      Mouth: Mucous  "membranes are moist.   Eyes:      General:         Right eye: No discharge.         Left eye: No discharge.   Cardiovascular:      Rate and Rhythm: Normal rate.   Pulmonary:      Effort: No respiratory distress.   Abdominal:      General: There is no distension.   Musculoskeletal:         General: Deformity (contractures, chronic) present. No swelling.   Skin:     General: Skin is warm and dry.   Neurological:      Mental Status: He is alert. Mental status is at baseline.   Psychiatric:         Mood and Affect: Mood normal.         Behavior: Behavior normal.         Recent labs:  Lab Results   Component Value Date/Time    SODIUM 136 02/27/2024 08:53 AM    K 4.9 02/27/2024 08:53 AM    BUN 31 (H) 02/27/2024 08:53 AM    CREATININE 0.36 (L) 02/27/2024 08:53 AM    GLUC 114 (H) 02/27/2024 08:53 AM    CALCIUM 9.0 02/27/2024 08:53 AM    AST 10 02/27/2024 08:53 AM    ALT 9 02/27/2024 08:53 AM    ALB 3.8 02/27/2024 08:53 AM    TP 7.0 02/27/2024 08:53 AM    EGFR 127 02/27/2024 08:53 AM    EGFR 132 10/23/2020 03:55 PM     Lab Results   Component Value Date/Time    INR 1.0 09/22/2023 09:15 AM     No results found for: \"CHN2TOCDENTS\"    Recent Imaging:  Procedure: IR nephrostomy tube check/change/reposition/reinsertion/upsize  Result Date: 12/6/2024  Narrative: PROCEDURE: Right PCN catheter exchange Procedural Personnel Attending physician(s): Dr. Vega Pre-procedure diagnosis: Hydronephrosis Post-procedure diagnosis: Same Indication: Routine 2-month PCN exchange. PROCEDURE SUMMARY - Right PCN exchange under fluoroscopic guidance PROCEDURE DETAILS: Pre-procedure Consent: Existing informed consent was utilized and time-out was performed prior to the procedure. Preparation: The site was prepared and draped using maximal sterile barrier technique including cutaneous antisepsis. Anesthesia/sedation Level of anesthesia/sedation: Local lidocaine Right PCN catheter exchange Existing catheter was exchanged for a new 10 Danish 60 cm " catheter over a Bentson wire. Contrast was injected through the new catheter under fluoroscopy to confirm proper positioning. Catheter was secured to skin using 0 Prolene suture and connected to bag gravity drainage. Contrast Contrast agent: Omnipaque Contrast volume (mL): 8 Radiation Dose Fluoroscopy time (minutes): 0.4 Reference air kerma (mGy): 12 Additional Details Estimated blood loss (mL): None Complications: No immediate complications.     Impression: Right PCN exchange using 10 Guinean 60 cm catheter. Plan: Return in 2 months for routine catheter exchange. Workstation performed: [id number]     Procedure: IR nephrostomy tube check/change/reposition/reinsertion/upsize  Result Date: 10/4/2024  Narrative: IR NEPHROSTOMY TUBE CHECK/CHANGE/REPOSITION/REINSERTION/UPSIZE Clinical History: N13.2: Hydronephrosis with renal and ureteral calculous obstruction Fluoroscopy time: 0.9 MINUTES ( 49 mGy ) Contrast: 10 mL of iohexol (OMNIPAQUE) Sedation time: N/A Procedure: The patient was placed prone on the fluoroscopy table. A  view demonstrates a right 10 Guinean nephrostomy catheter. The catheter and site were prepped and draped in the usual sterile fashion. The catheter was injected with contrast confirming proper positioning. The catheter was transected and removed over a heavy wire. A new 10 Guinean by 60 cm nephrostomy catheter was inserted with the pigtail formed in the renal pelvis. Contrast injection confirmed proper positioning. The catheter was secured in place with 0 Prolene suture and placed to gravity drainage. The patient tolerated the procedure well and left the department in stable condition.     Impression: Impression: Status post right nephrostomy exchange Workstation performed: [id number]       Administrative Statements   I have spent a total time of 30 minutes in caring for this patient on the day of the visit/encounter including Risks and benefits of tx options, Instructions for management,  Patient and family education, Importance of tx compliance, Counseling / Coordination of care, Documenting in the medical record, Reviewing / ordering tests, medicine, procedures  , Obtaining or reviewing history  , and Communicating with other healthcare professionals .

## 2024-12-19 ENCOUNTER — HOME CARE VISIT (OUTPATIENT)
Dept: HOME HEALTH SERVICES | Facility: HOME HEALTHCARE | Age: 64
End: 2024-12-19
Payer: COMMERCIAL

## 2024-12-19 VITALS
SYSTOLIC BLOOD PRESSURE: 114 MMHG | DIASTOLIC BLOOD PRESSURE: 84 MMHG | RESPIRATION RATE: 18 BRPM | TEMPERATURE: 98.4 F | HEART RATE: 74 BPM | OXYGEN SATURATION: 99 %

## 2024-12-19 PROCEDURE — G0299 HHS/HOSPICE OF RN EA 15 MIN: HCPCS

## 2024-12-20 ENCOUNTER — TELEPHONE (OUTPATIENT)
Age: 64
End: 2024-12-20

## 2024-12-20 ENCOUNTER — HOME CARE VISIT (OUTPATIENT)
Dept: HOME HEALTH SERVICES | Facility: HOME HEALTHCARE | Age: 64
End: 2024-12-20
Payer: COMMERCIAL

## 2024-12-20 NOTE — TELEPHONE ENCOUNTER
Patients wife called called and is asking if we can prescribe a medication for his wound. She says Dr. Roberts has done it in the past and would like something to help get them through the holidays

## 2024-12-20 NOTE — TELEPHONE ENCOUNTER
Call received from patients wife regarding patients chest wound getting red. She is requesting bactrim to be sent in for this which is what Dr Roberts said he would prescribe if his wound gets red again.

## 2024-12-27 ENCOUNTER — HOME CARE VISIT (OUTPATIENT)
Dept: HOME HEALTH SERVICES | Facility: HOME HEALTHCARE | Age: 64
End: 2024-12-27
Payer: COMMERCIAL

## 2024-12-31 ENCOUNTER — HOME CARE VISIT (OUTPATIENT)
Dept: HOME HEALTH SERVICES | Facility: HOME HEALTHCARE | Age: 64
End: 2024-12-31
Payer: COMMERCIAL

## 2024-12-31 PROCEDURE — G0299 HHS/HOSPICE OF RN EA 15 MIN: HCPCS

## 2025-01-01 VITALS
SYSTOLIC BLOOD PRESSURE: 120 MMHG | RESPIRATION RATE: 16 BRPM | DIASTOLIC BLOOD PRESSURE: 82 MMHG | TEMPERATURE: 97.9 F | HEART RATE: 68 BPM | OXYGEN SATURATION: 98 %

## 2025-01-06 ENCOUNTER — TELEPHONE (OUTPATIENT)
Age: 65
End: 2025-01-06

## 2025-01-06 NOTE — TELEPHONE ENCOUNTER
Call from patient's wife, Patty, asking for advice as to how to elevate her 's head since his motorized bed broke and she is awaiting a new script so they can get a new one. Suggested placing a wedge under his back and neck along with pillows. She was appreciative of the advice and had no other questions at this time.

## 2025-01-06 NOTE — TELEPHONE ENCOUNTER
Patients wife Patty calling due to concerns that motor from bed is broken and bed will not elevate patient. Patient is laying wedge and not comfortable and concerns with him having high blood pressure. She has called [facility] and was told since patient owns the bed they can not service the bed. Patty would like a STAT script sent to [facility] or to a DME where the patient can get the hospital bed as soon as possible.    Please call Patty at [phone] if any questions

## 2025-01-07 ENCOUNTER — TELEPHONE (OUTPATIENT)
Dept: PALLIATIVE MEDICINE | Facility: CLINIC | Age: 65
End: 2025-01-07

## 2025-01-07 ENCOUNTER — TELEPHONE (OUTPATIENT)
Age: 65
End: 2025-01-07

## 2025-01-07 NOTE — TELEPHONE ENCOUNTER
[facility] calling in regarding the wound care supplies form that was faxed back to them on 12/30. Form needs to be resubmitted with the questions answered with Dr. Roberts's signature.  Please review, thank you!

## 2025-01-08 LAB
DME PARACHUTE DELIVERY DATE ACTUAL: NORMAL
DME PARACHUTE DELIVERY DATE EXPECTED: NORMAL
DME PARACHUTE DELIVERY DATE REQUESTED: NORMAL
DME PARACHUTE ITEM DESCRIPTION: NORMAL
DME PARACHUTE ORDER STATUS: NORMAL
DME PARACHUTE SUPPLIER NAME: NORMAL
DME PARACHUTE SUPPLIER PHONE: NORMAL

## 2025-01-09 ENCOUNTER — HOME CARE VISIT (OUTPATIENT)
Dept: HOME HEALTH SERVICES | Facility: HOME HEALTHCARE | Age: 65
End: 2025-01-09
Payer: MEDICARE

## 2025-01-09 VITALS
HEART RATE: 72 BPM | OXYGEN SATURATION: 99 % | SYSTOLIC BLOOD PRESSURE: 108 MMHG | RESPIRATION RATE: 16 BRPM | BODY MASS INDEX: 19.99 KG/M2 | HEIGHT: 72 IN | DIASTOLIC BLOOD PRESSURE: 72 MMHG | TEMPERATURE: 98 F

## 2025-01-09 PROCEDURE — G0299 HHS/HOSPICE OF RN EA 15 MIN: HCPCS

## 2025-01-09 NOTE — CASE COMMUNICATION
Medication discrepancies or Major drug interactions: doxepin and venlafaxine  Abnormal clinical findings: Sutures holding nephrostomy in place have pulled out. Nephrostomy still patent & draining well. Being held in place w/ dressing/tape.  This report is informational only, no response is needed  [facility] has Admitted your patient to Home Health service with the following disciplines: SN  Patient stated goals of care: For my woun ds to heal  Potential barriers to goal achievement: Pt is a quadriplegic & completely dependent on others for mobility.  Primary focus of home health care:Wound  Anticipated visit pattern and next visit date: 1w9, 1/13/25  Thank you for allowing us to participate in the care of your patient.      Joel Cortez RN

## 2025-01-10 ENCOUNTER — HOME CARE VISIT (OUTPATIENT)
Dept: HOME HEALTH SERVICES | Facility: HOME HEALTHCARE | Age: 65
End: 2025-01-10
Payer: MEDICARE

## 2025-01-10 ENCOUNTER — PREP FOR PROCEDURE (OUTPATIENT)
Dept: INTERVENTIONAL RADIOLOGY/VASCULAR | Facility: CLINIC | Age: 65
End: 2025-01-10

## 2025-01-10 DIAGNOSIS — N13.2 HYDRONEPHROSIS WITH OBSTRUCTING CALCULUS: Primary | ICD-10-CM

## 2025-01-13 ENCOUNTER — HOME CARE VISIT (OUTPATIENT)
Dept: HOME HEALTH SERVICES | Facility: HOME HEALTHCARE | Age: 65
End: 2025-01-13
Payer: MEDICARE

## 2025-01-13 NOTE — CASE COMMUNICATION
Pt texted SN prior to visit stating that he did not need a visit this week. Next visit scheduled for 1/23/25.

## 2025-01-17 ENCOUNTER — HOME CARE VISIT (OUTPATIENT)
Dept: HOME HEALTH SERVICES | Facility: HOME HEALTHCARE | Age: 65
End: 2025-01-17
Payer: MEDICARE

## 2025-01-17 VITALS
TEMPERATURE: 98 F | RESPIRATION RATE: 16 BRPM | OXYGEN SATURATION: 98 % | SYSTOLIC BLOOD PRESSURE: 150 MMHG | HEART RATE: 67 BPM | DIASTOLIC BLOOD PRESSURE: 86 MMHG

## 2025-01-17 PROCEDURE — G0299 HHS/HOSPICE OF RN EA 15 MIN: HCPCS

## 2025-01-22 ENCOUNTER — HOME CARE VISIT (OUTPATIENT)
Dept: HOME HEALTH SERVICES | Facility: HOME HEALTHCARE | Age: 65
End: 2025-01-22
Payer: MEDICARE

## 2025-01-22 PROCEDURE — G0299 HHS/HOSPICE OF RN EA 15 MIN: HCPCS

## 2025-01-23 VITALS
OXYGEN SATURATION: 100 % | DIASTOLIC BLOOD PRESSURE: 82 MMHG | SYSTOLIC BLOOD PRESSURE: 118 MMHG | TEMPERATURE: 97.8 F | HEART RATE: 78 BPM | RESPIRATION RATE: 18 BRPM

## 2025-01-27 ENCOUNTER — HOME CARE VISIT (OUTPATIENT)
Dept: HOME HEALTH SERVICES | Facility: HOME HEALTHCARE | Age: 65
End: 2025-01-27
Payer: MEDICARE

## 2025-01-28 ENCOUNTER — HOME CARE VISIT (OUTPATIENT)
Dept: HOME HEALTH SERVICES | Facility: HOME HEALTHCARE | Age: 65
End: 2025-01-28
Payer: MEDICARE

## 2025-01-28 PROCEDURE — G0155 HHCP-SVS OF CSW,EA 15 MIN: HCPCS

## 2025-01-29 ENCOUNTER — HOME CARE VISIT (OUTPATIENT)
Dept: HOME HEALTH SERVICES | Facility: HOME HEALTHCARE | Age: 65
End: 2025-01-29
Payer: MEDICARE

## 2025-01-29 VITALS
HEART RATE: 66 BPM | TEMPERATURE: 98.2 F | OXYGEN SATURATION: 99 % | SYSTOLIC BLOOD PRESSURE: 122 MMHG | DIASTOLIC BLOOD PRESSURE: 64 MMHG | RESPIRATION RATE: 16 BRPM

## 2025-01-29 PROCEDURE — G0299 HHS/HOSPICE OF RN EA 15 MIN: HCPCS

## 2025-02-04 ENCOUNTER — HOME CARE VISIT (OUTPATIENT)
Dept: HOME HEALTH SERVICES | Facility: HOME HEALTHCARE | Age: 65
End: 2025-02-04
Payer: COMMERCIAL

## 2025-02-04 VITALS
DIASTOLIC BLOOD PRESSURE: 81 MMHG | HEART RATE: 79 BPM | SYSTOLIC BLOOD PRESSURE: 110 MMHG | TEMPERATURE: 97.8 F | OXYGEN SATURATION: 98 %

## 2025-02-04 PROCEDURE — G0299 HHS/HOSPICE OF RN EA 15 MIN: HCPCS

## 2025-02-06 ENCOUNTER — HOME CARE VISIT (OUTPATIENT)
Dept: HOME HEALTH SERVICES | Facility: HOME HEALTHCARE | Age: 65
End: 2025-02-06
Payer: COMMERCIAL

## 2025-02-07 NOTE — CASE COMMUNICATION
This is FYI only. New SOC completed due to pt's insurance change.     Medication discrepancies or Major drug interactions  Abnormal clinical findings   This report is informational only, no response is needed  [facility] has Admitted your patient to Home Health service with the following disciplines: SN  Patient stated goals of care to manage symptoms at home  Potential barriers to goal achievement   Primary focus of home health car e:Wound  Anticipated visit pattern and next visit date weekly  Thank you for allowing us to participate in the care of your patient.      Shiela Benitez RN

## 2025-02-07 NOTE — CASE COMMUNICATION
Ship to  Pt. Home     Ordering MD DR. REINOSO (home health only)    Wound 1 R LATERAL UPPER BACK     Full     Frequency EVERY OTHER DAY  Wound 2  R THIGH      Full       Frequency EVERY OTHER DAY  Wound 3 R LATERAL LOWER BACK    Full       Frequency EVERY OTHER DAY  Wound 4 R PROXIMAL HIP     Full       Frequency EVERY OTHER DAY  Wound 5 L ELBOW    Full       Frequency EVERY OTHER DAY    All items are ordered by the each unless other wise noted.  Orders should be for a 2 week period (unless noted by insurance)    Gauze 4x4 N/S 200 4x4s per unit  142455 1     Drain sponges 4x4 split [id number] 30  Telfa pad 3x4 9973 30  Tape. Mefix 2inch [id number] 1    Hydrocellular Foam Non-Adh. 4x4 [id number] 10

## 2025-02-12 ENCOUNTER — TELEPHONE (OUTPATIENT)
Dept: RADIOLOGY | Facility: HOSPITAL | Age: 65
End: 2025-02-12

## 2025-02-12 ENCOUNTER — TELEPHONE (OUTPATIENT)
Age: 65
End: 2025-02-12

## 2025-02-12 ENCOUNTER — HOME CARE VISIT (OUTPATIENT)
Dept: HOME HEALTH SERVICES | Facility: HOME HEALTHCARE | Age: 65
End: 2025-02-12
Payer: COMMERCIAL

## 2025-02-12 VITALS
HEART RATE: 76 BPM | RESPIRATION RATE: 16 BRPM | SYSTOLIC BLOOD PRESSURE: 128 MMHG | TEMPERATURE: 97.7 F | DIASTOLIC BLOOD PRESSURE: 78 MMHG | OXYGEN SATURATION: 100 %

## 2025-02-12 DIAGNOSIS — Z20.828 EXPOSURE TO THE FLU: Primary | ICD-10-CM

## 2025-02-12 PROCEDURE — G0299 HHS/HOSPICE OF RN EA 15 MIN: HCPCS

## 2025-02-12 RX ORDER — OSELTAMIVIR PHOSPHATE 75 MG/1
75 CAPSULE ORAL DAILY
Qty: 7 CAPSULE | Refills: 0 | Status: SHIPPED | OUTPATIENT
Start: 2025-02-12 | End: 2025-02-19

## 2025-02-12 NOTE — TELEPHONE ENCOUNTER
Pts wife called to reschedule neph tube change on 2/13 at 11am. Aid is sick with the flu and appt conflicts with wifes schedule. Offered next available on 2/28 and Pts wife declined due to appt being further out than expected. PT decided they will be keeping appt on 2/13

## 2025-02-12 NOTE — TELEPHONE ENCOUNTER
Pt wife stated that pt's home aid went home with the flu. She would like to know if Dr. Roberts could send Tamiflu to the [facility] pharmacy at [address], [place] [address]. Phone number is [phone]. She would like a call back at [phone] letting her know if this can be completed.

## 2025-02-13 ENCOUNTER — HOSPITAL ENCOUNTER (OUTPATIENT)
Dept: RADIOLOGY | Facility: HOSPITAL | Age: 65
Discharge: HOME | End: 2025-02-13
Payer: COMMERCIAL

## 2025-02-13 DIAGNOSIS — N13.2 HYDRONEPHROSIS WITH OBSTRUCTING CALCULUS: ICD-10-CM

## 2025-02-13 PROCEDURE — 50435 EXCHANGE NEPHROSTOMY CATH: CPT

## 2025-02-13 PROCEDURE — C1769 GUIDE WIRE: HCPCS

## 2025-02-13 PROCEDURE — 50435 EXCHANGE NEPHROSTOMY CATH: CPT | Performed by: RADIOLOGY

## 2025-02-13 PROCEDURE — C1729 CATH, DRAINAGE: HCPCS

## 2025-02-13 RX ADMIN — IOHEXOL 12 ML: 350 INJECTION, SOLUTION INTRAVENOUS at 12:07

## 2025-02-18 ENCOUNTER — HOME CARE VISIT (OUTPATIENT)
Dept: HOME HEALTH SERVICES | Facility: HOME HEALTHCARE | Age: 65
End: 2025-02-18
Payer: COMMERCIAL

## 2025-02-19 ENCOUNTER — HOME CARE VISIT (OUTPATIENT)
Dept: HOME HEALTH SERVICES | Facility: HOME HEALTHCARE | Age: 65
End: 2025-02-19
Payer: COMMERCIAL

## 2025-02-19 ENCOUNTER — TELEPHONE (OUTPATIENT)
Age: 65
End: 2025-02-19

## 2025-02-19 PROCEDURE — G0299 HHS/HOSPICE OF RN EA 15 MIN: HCPCS

## 2025-02-19 NOTE — TELEPHONE ENCOUNTER
Patients wife wants to speak with Dr Roberts because other doctors are saying that he should have his nephrostomy bag removed, but she wants the doctors opinion before they do that. Please call them back to discuss

## 2025-02-20 VITALS
TEMPERATURE: 98.1 F | SYSTOLIC BLOOD PRESSURE: 132 MMHG | OXYGEN SATURATION: 99 % | DIASTOLIC BLOOD PRESSURE: 76 MMHG | RESPIRATION RATE: 16 BRPM | HEART RATE: 78 BPM

## 2025-02-25 ENCOUNTER — HOME CARE VISIT (OUTPATIENT)
Dept: HOME HEALTH SERVICES | Facility: HOME HEALTHCARE | Age: 65
End: 2025-02-25
Payer: COMMERCIAL

## 2025-02-25 PROCEDURE — G0299 HHS/HOSPICE OF RN EA 15 MIN: HCPCS

## 2025-02-26 VITALS
RESPIRATION RATE: 18 BRPM | TEMPERATURE: 97.5 F | OXYGEN SATURATION: 99 % | SYSTOLIC BLOOD PRESSURE: 144 MMHG | DIASTOLIC BLOOD PRESSURE: 72 MMHG | HEART RATE: 69 BPM

## 2025-03-05 ENCOUNTER — HOME CARE VISIT (OUTPATIENT)
Dept: HOME HEALTH SERVICES | Facility: HOME HEALTHCARE | Age: 65
End: 2025-03-05
Payer: COMMERCIAL

## 2025-03-05 PROCEDURE — G0299 HHS/HOSPICE OF RN EA 15 MIN: HCPCS

## 2025-03-06 VITALS
DIASTOLIC BLOOD PRESSURE: 75 MMHG | TEMPERATURE: 68 F | SYSTOLIC BLOOD PRESSURE: 95 MMHG | OXYGEN SATURATION: 99 % | HEART RATE: 68 BPM | RESPIRATION RATE: 18 BRPM

## 2025-03-07 ENCOUNTER — HOSPITAL ENCOUNTER (OUTPATIENT)
Dept: CT IMAGING | Facility: HOSPITAL | Age: 65
Discharge: HOME | End: 2025-03-07
Attending: RADIOLOGY

## 2025-03-07 DIAGNOSIS — N13.2 HYDRONEPHROSIS WITH OBSTRUCTING CALCULUS: ICD-10-CM

## 2025-03-13 ENCOUNTER — HOME CARE VISIT (OUTPATIENT)
Dept: HOME HEALTH SERVICES | Facility: HOME HEALTHCARE | Age: 65
End: 2025-03-13
Payer: COMMERCIAL

## 2025-03-17 ENCOUNTER — HOSPITAL ENCOUNTER (OUTPATIENT)
Dept: RADIOLOGY | Facility: HOSPITAL | Age: 65
Discharge: HOME | End: 2025-03-17
Attending: RADIOLOGY

## 2025-03-17 ENCOUNTER — HOSPITAL ENCOUNTER (OUTPATIENT)
Dept: CT IMAGING | Facility: HOSPITAL | Age: 65
Discharge: HOME | End: 2025-03-17
Attending: RADIOLOGY
Payer: COMMERCIAL

## 2025-03-17 DIAGNOSIS — N13.2 HYDRONEPHROSIS WITH OBSTRUCTING CALCULUS: ICD-10-CM

## 2025-03-17 PROCEDURE — NC001 PR NO CHARGE: Performed by: RADIOLOGY

## 2025-03-17 PROCEDURE — 74176 CT ABD & PELVIS W/O CONTRAST: CPT

## 2025-03-20 ENCOUNTER — HOME CARE VISIT (OUTPATIENT)
Dept: HOME HEALTH SERVICES | Facility: HOME HEALTHCARE | Age: 65
End: 2025-03-20
Payer: COMMERCIAL

## 2025-03-21 NOTE — CASE COMMUNICATION
The pt texted SN after SN left pt VM for a visit, stating that he would like to cancel all services w/ the [facility]NA. Agency d/c completed.

## 2025-04-15 ENCOUNTER — TELEPHONE (OUTPATIENT)
Age: 65
End: 2025-04-15

## 2025-04-15 NOTE — TELEPHONE ENCOUNTER
Patient's wife, Patty, called to see if Dr. Roberts can order something different to help with yeast growth such as diflucan. States she has been using Nystop on his neck.  This is something he has had on and off since surgery in the past because he can't move his neck much. However, now he has the yeast growth and raw skin on his neck folds and also in his grown. States it burns and itches sometimes and nothing is working. She has tried nystop, tinactin, things for foot fungus over the counter. He uses  [facility] and Patty can be reached at [phone]. She will send some images via [employer] later today.

## 2025-04-16 DIAGNOSIS — B35.9 TINEA: Primary | ICD-10-CM

## 2025-04-16 RX ORDER — FLUCONAZOLE 200 MG/1
200 TABLET ORAL DAILY
Qty: 14 TABLET | Refills: 0 | Status: SHIPPED | OUTPATIENT
Start: 2025-04-16 | End: 2025-04-30

## 2025-04-23 ENCOUNTER — OFFICE VISIT (OUTPATIENT)
Dept: PALLIATIVE MEDICINE | Facility: CLINIC | Age: 65
End: 2025-04-23

## 2025-04-23 VITALS
DIASTOLIC BLOOD PRESSURE: 60 MMHG | SYSTOLIC BLOOD PRESSURE: 100 MMHG | TEMPERATURE: 97.4 F | OXYGEN SATURATION: 98 % | HEART RATE: 74 BPM

## 2025-04-23 DIAGNOSIS — B36.9 FUNGAL RASH OF TORSO: ICD-10-CM

## 2025-04-23 DIAGNOSIS — R06.02 SHORTNESS OF BREATH: ICD-10-CM

## 2025-04-23 DIAGNOSIS — G82.50 QUADRIPLEGIA (HCC): Primary | ICD-10-CM

## 2025-04-23 DIAGNOSIS — I35.0 SEVERE AORTIC STENOSIS: ICD-10-CM

## 2025-04-23 DIAGNOSIS — Z51.5 PALLIATIVE CARE BY SPECIALIST: ICD-10-CM

## 2025-04-23 NOTE — ASSESSMENT & PLAN NOTE
- spent time discussing today.  At this point he is not pursuing any procedure for this, though he now  has his nephrostomy tube removed.

## 2025-04-23 NOTE — PROGRESS NOTES
Name: Herminio Roth      : 1960      MRN: [id number]  Encounter Provider: Stefan Roberts MD  Encounter Date: 2025   Encounter department[facility]  :  Assessment & Plan  Quadriplegia (HCC)         Shortness of breath         Severe aortic stenosis  - spent time discussing today.  At this point he is not pursuing any procedure for this, though he now  has his nephrostomy tube removed.         Palliative care by specialist  - Will follow up in 2 months.         Fungal rash of torso  Patient's rash appears worsen than previous.  Provided warning that if he developed systemic systems I would advise hospital evaluation.  Otherwise, I have re-referred him to wound care as well as ID for possible refractory fungal rash.  Dermatology would be another consideration.  I spoke to his wife about using low-dose OTC hydrocortisone for the pruritis.         Orders:    Ambulatory Referral to Infectious Disease; Future    Ambulatory Referral to Wound Care; Future            PDMP Review: I have reviewed the patient's controlled substance dispensing history in the Prescription Drug Monitoring Program in compliance with the [facility] regulations before prescribing any controlled substances.    History of Present Illness   Herminio Roth is a 65 y.o. male with hx of quadriplegia and severe AS, as well as various wounds in various stages of healing who presents for follow up.  Since our last visit, he has ended his relationship with home VNA and Patty has resumed his wound care.  Denies fever, chills, nausea, vomiting.  Still with labile BPs.  Only major concern is non-healing rash in his R neck/upper trunk.  Patty has tried any number of topical items, and I rx'd fluconazole.  This did help with some other areas of tinea, but has not improved his neck rash.        Objective   There were no vitals taken for this visit.    Physical Exam  Vitals and nursing note reviewed.  "  Constitutional:       General: He is not in acute distress.     Appearance: He is ill-appearing. He is not toxic-appearing or diaphoretic.   HENT:      Head: Atraumatic.      Right Ear: External ear normal.      Left Ear: External ear normal.      Nose: Nose normal.      Mouth/Throat:      Mouth: Mucous membranes are moist.   Eyes:      General:         Right eye: No discharge.         Left eye: No discharge.   Cardiovascular:      Rate and Rhythm: Normal rate.   Pulmonary:      Effort: No respiratory distress.   Abdominal:      General: There is no distension.   Musculoskeletal:         General: Deformity (contractures) present.   Skin:     General: Skin is warm and dry.      Findings: Rash (R neck extending to R trunk and around his back) present.   Neurological:      General: No focal deficit present.      Mental Status: He is alert and oriented to person, place, and time.   Psychiatric:         Mood and Affect: Mood normal.         Behavior: Behavior normal.         Recent labs:  Lab Results   Component Value Date/Time    SODIUM 136 02/27/2024 08:53 AM    K 4.9 02/27/2024 08:53 AM    BUN 31 (H) 02/27/2024 08:53 AM    CREATININE 0.36 (L) 02/27/2024 08:53 AM    GLUC 114 (H) 02/27/2024 08:53 AM    CALCIUM 9.0 02/27/2024 08:53 AM    AST 10 02/27/2024 08:53 AM    ALT 9 02/27/2024 08:53 AM    ALB 3.8 02/27/2024 08:53 AM    TP 7.0 02/27/2024 08:53 AM    EGFR 127 02/27/2024 08:53 AM    EGFR 132 10/23/2020 03:55 PM     Lab Results   Component Value Date/Time    INR 1.0 09/22/2023 09:15 AM     No results found for: \"[id number]\"    Recent Imaging:  Procedure: IR nephrostomy tube check/change/reposition/reinsertion/upsize  Result Date: 3/17/2025  Impression: Previous right-sided urinary calculi have resolved. There is no hydronephrosis, with capped nephrostomy tube in place Based on results of this scan and clinical passage of capping trial the tube was removed Chronic bony deformity of the hips with diffuse fatty " atrophy of the muscles related to patient's paraplegia Workstation performed: [id number]     Procedure: CT renal stone study abdomen pelvis wo contrast  Result Date: 3/17/2025  Impression: Previous right-sided urinary calculi have resolved. 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 No radiopaque stone seen. Last CT imaging was in September 2023. Plan to initiate capping trial. Will arrange for renal stone CT on the day of his follow-up nephrostogram to be reviewed prior to determining plan. He has not seen urologist in some time. He has a large open wound surrounding his catheter which does not appear infected. If there is no residual UPJ stone and he has tolerated capping trial, consideration could be made for removal of PCN. If there is still a stone, may not be prudent to remove the tube and would consider conversion to PCNU and have the patient follow-up with urology for consideration of ureteroscopy and lithotripsy of the UPJ stone to facilitate removal of his drainage catheter. Workstation performed: [id number]       Administrative Statements   I have spent a total time of 30 minutes in caring for this patient on the day of the visit/encounter including Risks and benefits of tx options, Instructions for management, Patient and family education, Importance of tx compliance, Risk factor reductions, Counseling / Coordination of care, Documenting in the medical record, Reviewing/placing orders in the medical  record (including tests, medications, and/or procedures), and Obtaining or reviewing history  .   Topics discussed with the patient / family include symptom assessment and management, medication review, medication adjustment, psychosocial support, goals of care, supportive listening, and anticipatory guidance.

## 2025-05-01 ENCOUNTER — TELEPHONE (OUTPATIENT)
Age: 65
End: 2025-05-01

## 2025-05-01 NOTE — TELEPHONE ENCOUNTER
ID Referral    Thank you for referring Herminio Roth,  1960, to [facility]. At this time we are closing this referral due to:    Patient needs to be treated by Dermatology and/or Wound Care prior to being seen at Infectious Disease.   If Dermatology and/or Wound Care feels the patient needs to be evaluated by Infectious Disease they will place the referral.     Thank you again and have a good day.

## 2025-05-05 ENCOUNTER — TELEPHONE (OUTPATIENT)
Age: 65
End: 2025-05-05

## 2025-05-05 ENCOUNTER — TELEPHONE (OUTPATIENT)
Dept: PALLIATIVE MEDICINE | Facility: CLINIC | Age: 65
End: 2025-05-05

## 2025-05-05 NOTE — TELEPHONE ENCOUNTER
Pt wife calling back stating that losartan was being refilled by cardiology. Pt wife states he was seen 1 time and then came to pallative care.

## 2025-05-05 NOTE — TELEPHONE ENCOUNTER
Returned call to patient's wife but the voicemail did not identify that it was her number or the patient's. Left non detailed message to return call. If she calls back, please find out who usually orders his losartan and direct her to contact their office. Will forward to Palliative Care office for review tomorrow.

## 2025-05-05 NOTE — TELEPHONE ENCOUNTER
Patient is requesting Losartan 100mg 1 tab daily #90. Please review and advise. Patient uses [id number] - [place] - [phone]

## 2025-07-01 ENCOUNTER — TELEPHONE (OUTPATIENT)
Age: 65
End: 2025-07-01

## 2025-07-01 DIAGNOSIS — R19.5 POSITIVE COLORECTAL CANCER SCREENING USING COLOGUARD TEST: Primary | ICD-10-CM

## 2025-07-01 NOTE — TELEPHONE ENCOUNTER
Pt had a positive colagard test and asked if Dr. Roberts could put in a referral for GI. They said they spoke to his pcp who is at [facility] but they want to be seen at [facility]

## 2025-07-21 ENCOUNTER — TELEPHONE (OUTPATIENT)
Age: 65
End: 2025-07-21

## 2025-07-21 DIAGNOSIS — R19.5 POSITIVE COLORECTAL CANCER SCREENING USING COLOGUARD TEST: Primary | ICD-10-CM

## 2025-07-21 NOTE — TELEPHONE ENCOUNTER
Patients wife called, stating she spoke to Gastro this morning in regards to the referral placed. They had advised her that we need to place a new order due to them closing it after contacting him twice. She stated they did not know they were called as they were calling the patient and his phone is not working and to utilize her number, which was then updated in the chart for the time.

## 2025-07-21 NOTE — TELEPHONE ENCOUNTER
Wife calling regarding symptoms  is having.  Had referral placed and it appears we reached out twice but unable to contact.  States his phone does not always work and needs to call hers.  Advised to get new referral and to call PCP regarding current symptoms.  States understanding.